# Patient Record
Sex: MALE | Race: BLACK OR AFRICAN AMERICAN | NOT HISPANIC OR LATINO | Employment: FULL TIME | URBAN - METROPOLITAN AREA
[De-identification: names, ages, dates, MRNs, and addresses within clinical notes are randomized per-mention and may not be internally consistent; named-entity substitution may affect disease eponyms.]

---

## 2017-01-26 ENCOUNTER — OFFICE VISIT (OUTPATIENT)
Dept: DERMATOLOGY | Facility: CLINIC | Age: 32
End: 2017-01-26

## 2017-01-26 DIAGNOSIS — B35.1 ONYCHOMYCOSIS: ICD-10-CM

## 2017-01-26 DIAGNOSIS — B35.1 ONYCHOMYCOSIS: Primary | ICD-10-CM

## 2017-01-26 LAB
ALBUMIN SERPL-MCNC: 4 G/DL (ref 3.4–5)
ALP SERPL-CCNC: 69 U/L (ref 40–150)
ALT SERPL W P-5'-P-CCNC: 16 U/L (ref 0–70)
AST SERPL W P-5'-P-CCNC: 8 U/L (ref 0–45)
BASOPHILS # BLD AUTO: 0 10E9/L (ref 0–0.2)
BASOPHILS NFR BLD AUTO: 0.6 %
BILIRUB DIRECT SERPL-MCNC: 0.1 MG/DL (ref 0–0.2)
BILIRUB SERPL-MCNC: 0.5 MG/DL (ref 0.2–1.3)
DIFFERENTIAL METHOD BLD: NORMAL
EOSINOPHIL # BLD AUTO: 0.2 10E9/L (ref 0–0.7)
EOSINOPHIL NFR BLD AUTO: 2.4 %
ERYTHROCYTE [DISTWIDTH] IN BLOOD BY AUTOMATED COUNT: 13.7 % (ref 10–15)
HCT VFR BLD AUTO: 45.1 % (ref 40–53)
HGB BLD-MCNC: 15.6 G/DL (ref 13.3–17.7)
IMM GRANULOCYTES # BLD: 0 10E9/L (ref 0–0.4)
IMM GRANULOCYTES NFR BLD: 0.2 %
LYMPHOCYTES # BLD AUTO: 2.3 10E9/L (ref 0.8–5.3)
LYMPHOCYTES NFR BLD AUTO: 34.9 %
MCH RBC QN AUTO: 31.1 PG (ref 26.5–33)
MCHC RBC AUTO-ENTMCNC: 34.6 G/DL (ref 31.5–36.5)
MCV RBC AUTO: 90 FL (ref 78–100)
MONOCYTES # BLD AUTO: 0.6 10E9/L (ref 0–1.3)
MONOCYTES NFR BLD AUTO: 9.3 %
NEUTROPHILS # BLD AUTO: 3.5 10E9/L (ref 1.6–8.3)
NEUTROPHILS NFR BLD AUTO: 52.6 %
NRBC # BLD AUTO: 0 10*3/UL
NRBC BLD AUTO-RTO: 0 /100
PLATELET # BLD AUTO: 216 10E9/L (ref 150–450)
PROT SERPL-MCNC: 7 G/DL (ref 6.8–8.8)
RBC # BLD AUTO: 5.01 10E12/L (ref 4.4–5.9)
WBC # BLD AUTO: 6.7 10E9/L (ref 4–11)

## 2017-01-26 RX ORDER — TERBINAFINE HYDROCHLORIDE 250 MG/1
250 TABLET ORAL DAILY
Qty: 30 TABLET | Refills: 0 | Status: SHIPPED | OUTPATIENT
Start: 2017-01-26 | End: 2017-08-07

## 2017-01-26 ASSESSMENT — PAIN SCALES - GENERAL: PAINLEVEL: NO PAIN (0)

## 2017-01-26 NOTE — LETTER
1/26/2017       RE: Minh Pereira  [address]    [facility] [address]     Dear Colleague,    Thank you for referring your patient, Minh Pereira, to the [facility] DERMATOLOGY at [facility]. Please see a copy of my visit note below.    [facility] Dermatology Clinic Note     Dermatology Problem List:  1. Plaque psoriasis and psoriatic arthritis: Many years of disease.   -s/p methotrexate   - failed Enbrel/flared on enbrel  - on Humira 40 mg Q 2 weeks   2. Onychomycosis. culture 08/11/2016  Encounter Date: Jan 26, 2017   Chief Complaint   Patient presents with     Derm Problem     Psoriasis - on Humira. Minh states that his skin is doing well.     History of Present Illness:   This is a 31 year old male who presents to dermatology clinic for a follow up for plaque psoriasis and onychomycosis. He was last seen here 12/142016 when Humira injections were continued every other week, triamcinolone ointment was continued to the arms, legs, and trunk, and desonide ointment was continued twice daily to the face as needed.  His psoriasis is well controlled with his current regimen, and he has no adverse events to report.    For his onychomycosis, he was started on terbinafine 250 mg. He believes this nails have started to improve. He denies any nausea, vomiting, diarrhea, rashes, fatigue or new symptoms. No other skin concerns.       Past Medical History:   Allergies as of    Diagnosis     Psoriatic arthropathy (H)     Clavus     Backache     Hyperkeratosis of palms and soles     Psoriasis     Onychodystrophy     Encounter for long-term (current) use of high-risk medication     Social History:   The patient works for [employer] and [employer].    Medications:   Current Outpatient Prescriptions   Medication     adalimumab (HUMIRA PEN) 40 MG/0.8ML pen kit     vitamin D (ERGOCALCIFEROL) 98512 UNIT capsule     naproxen (NAPROSYN) 500 MG  tablet     fluocinonide (LIDEX) 0.05 % ointment     [DISCONTINUED] adalimumab (HUMIRA) 40 MG/0.8ML prefilled syringe kit     [DISCONTINUED] adalimumab (HUMIRA) 40 MG/0.8ML prefilled syringe kit     No current facility-administered medications for this visit.     Allergies as of 01/26/2017 - Review Complete 01/26/2017   Allergen Reaction Noted     Bees Swelling 05/05/2014     Nkda [no known drug allergies]  01/31/2013     Review of Systems:   Patient reports otherwise feeling well and denies any other skin concerns.  -MS: Arthralgias with season change  GI: No abdominal discomfort. No diarrhea or hematochezia.  Physical exam:   Vital signs: B/P: Data Unavailable, T: Data Unavailable, P: Data Unavailable, R: Data Unavailable  General: Well appearing male in no acute distress alert and oriented to time, person, place, and situation.  Examination: Examination of the face, head, neck, chest, abdomen, back, both arms, both hands, both legs, and both feet is performed.   -Postinflammatory hyperpigmentation of prior guttate papules noted throughout the trunk and lower extremities.  -Pitting noted of the fingernails.  -The toenail plates are hypertrophic and discolored with subungual debris, evidence of proximal clearing.     Assessment and Plan:   1. Plaque psoriasis and psoriatic arthritis, well controlled:     Humira injections every other week as directed.    Please continue to apply triamcinolone ointment twice daily to affected areas on the arms, legs and trunk as needed until improved.    Please continue desonide ointment twice daily to affected areas on the face as needed until improved.      2. Onychomycosis. Confirmed by fungus culture 08/11/2016:    Continue terbinafine 250 mg by mouth once daily.    Draw for CBC and hepatic panel, these are within normal limits.     Staff:      All risk, benefits and alternatives were discussed with patient.  Patient is in agreement and understands the assessment and plan.  All  questions were answered.  Sun Screen Education was given.   Return to Clinic in 6 weeks or sooner as needed.   Zakiya Caal PA-C

## 2017-01-26 NOTE — NURSING NOTE
Dermatology Rooming Note    Minh Pereira's goals for this visit include:   Chief Complaint   Patient presents with     Derm Problem     Psoriasis - on Humira. Minh states that his skin is doing well.     Irina Park, CMA

## 2017-01-26 NOTE — PROGRESS NOTES
[facility] Dermatology Clinic Note     Dermatology Problem List:  1. Plaque psoriasis and psoriatic arthritis: Many years of disease.   -s/p methotrexate   - failed Enbrel/flared on enbrel  - on Humira 40 mg Q 2 weeks   2. Onychomycosis. culture 08/11/2016  Encounter Date: Jan 26, 2017   Chief Complaint   Patient presents with     Derm Problem     Psoriasis - on Humira. Minh states that his skin is doing well.     History of Present Illness:   This is a 31 year old male who presents to dermatology clinic for a follow up for plaque psoriasis and onychomycosis. He was last seen here 12/142016 when Humira injections were continued every other week, triamcinolone ointment was continued to the arms, legs, and trunk, and desonide ointment was continued twice daily to the face as needed.  His psoriasis is well controlled with his current regimen, and he has no adverse events to report.    For his onychomycosis, he was started on terbinafine 250 mg. He believes this nails have started to improve. He denies any nausea, vomiting, diarrhea, rashes, fatigue or new symptoms. No other skin concerns.       Past Medical History:   Allergies as of    Diagnosis     Psoriatic arthropathy (H)     Clavus     Backache     Hyperkeratosis of palms and soles     Psoriasis     Onychodystrophy     Encounter for long-term (current) use of high-risk medication     Social History:   The patient works for [employer] and [employer].    Medications:   Current Outpatient Prescriptions   Medication     adalimumab (HUMIRA PEN) 40 MG/0.8ML pen kit     vitamin D (ERGOCALCIFEROL) 32345 UNIT capsule     naproxen (NAPROSYN) 500 MG tablet     fluocinonide (LIDEX) 0.05 % ointment     [DISCONTINUED] adalimumab (HUMIRA) 40 MG/0.8ML prefilled syringe kit     [DISCONTINUED] adalimumab (HUMIRA) 40 MG/0.8ML prefilled syringe kit     No current facility-administered medications for this visit.     Allergies as of 01/26/2017 - Review  Complete 01/26/2017   Allergen Reaction Noted     Bees Swelling 05/05/2014     Nkda [no known drug allergies]  01/31/2013     Review of Systems:   Patient reports otherwise feeling well and denies any other skin concerns.  -MS: Arthralgias with season change  GI: No abdominal discomfort. No diarrhea or hematochezia.  Physical exam:   Vital signs: B/P: Data Unavailable, T: Data Unavailable, P: Data Unavailable, R: Data Unavailable  General: Well appearing male in no acute distress alert and oriented to time, person, place, and situation.  Examination: Examination of the face, head, neck, chest, abdomen, back, both arms, both hands, both legs, and both feet is performed.   -Postinflammatory hyperpigmentation of prior guttate papules noted throughout the trunk and lower extremities.  -Pitting noted of the fingernails.  -The toenail plates are hypertrophic and discolored with subungual debris, evidence of proximal clearing.     Assessment and Plan:   1. Plaque psoriasis and psoriatic arthritis, well controlled:     Humira injections every other week as directed.    Please continue to apply triamcinolone ointment twice daily to affected areas on the arms, legs and trunk as needed until improved.    Please continue desonide ointment twice daily to affected areas on the face as needed until improved.      2. Onychomycosis. Confirmed by fungus culture 08/11/2016:    Continue terbinafine 250 mg by mouth once daily.    Draw for CBC and hepatic panel, these are within normal limits.     Staff:      All risk, benefits and alternatives were discussed with patient.  Patient is in agreement and understands the assessment and plan.  All questions were answered.  Sun Screen Education was given.   Return to Clinic in 6 weeks or sooner as needed.   Zakiya Caal PA-C

## 2017-01-26 NOTE — MR AVS SNAPSHOT
After Visit Summary   2017    Minh Pereira    MRN: [id number]           Patient Information     Date Of Birth          1985        Visit Information        Provider Department      2017 1:00 PM Zakiya Caal PA-C [facility] Dermatology        Today's Diagnoses     Onychomycosis    -  1       Follow-ups after your visit        Follow-up notes from your care team     Return in about 6 weeks (around 3/9/2017).      Your next 10 appointments already scheduled     Mar 09, 2017 12:00 PM CST   (Arrive by 11:45 AM)   Return Visit with RADHA Chairez [facility] Dermatology ([facility])    [address] [place] [id number] [phone]              Who to contact     Please call your clinic at [phone] to:    Ask questions about your health    Make or cancel appointments    Discuss your medicines    Learn about your test results    Speak to your doctor   If you have compliments or concerns about an experience at your clinic, or if you wish to file a complaint, please contact [facility] Patient Relations at [phone] or email us at [email or web address]         Additional Information About Your Visit        MyChart Information     [employer] is an electronic gateway that provides easy, online access to your medical records. With [employer], you can request a clinic appointment, read your test results, renew a prescription or communicate with your care team.     To sign up for [employer] visit the website at [email or web address]   You will be asked to enter the access code listed below, as well as some personal information. Please follow the directions to create your username and password.     Your access code is: [email or web address]  Expires: 2017  6:30 AM     Your access code will  in 90 days. If you need help or a new code, please contact your [facility]  Physicians Clinic or call [phone] for assistance.        Care EveryWhere ID     This is your Care EveryWhere ID. This could be used by other organizations to access your [facility] medical records  [id number]         Blood Pressure from Last 3 Encounters:   08/05/16 117/73   04/06/16 118/72   02/05/16 139/62    Weight from Last 3 Encounters:   08/05/16 74.6 kg (164 lb 6.4 oz)   04/06/16 78.4 kg (172 lb 12.8 oz)   02/05/16 78.5 kg (173 lb)                 Today's Medication Changes          These changes are accurate as of: 1/26/17 11:59 PM.  If you have any questions, ask your nurse or doctor.               These medicines have changed or have updated prescriptions.        Dose/Directions    adalimumab 40 MG/0.8ML pen kit   Commonly known as:  HUMIRA PEN   This may have changed:  Another medication with the same name was removed. Continue taking this medication, and follow the directions you see here.   Used for:  Psoriasis   Changed by:  Zakiya Caal PA-C        Dose:  40 mg   Inject 0.8 mLs (40 mg) Subcutaneous every 14 days   Quantity:  2 each   Refills:  4            Where to get your medicines      These medications were sent to [facility] - [place], [place] - [address] [place] [address]  [address] [place] [id number]    Hours:  Mon-Fri 8:30am-5:00pm Toll Free [phone] Phone:  [phone]     terbinafine 250 MG tablet                Primary Care Provider    [facility]       No address on file        Thank you!     Thank you for choosing [facility] DERMATOLOGY  for your care. Our goal is always to provide you with excellent care. Hearing back from our patients is one way we can continue to improve our services. Please take a few minutes to complete the written survey that you may receive in the mail after your visit with us. Thank you!             Your Updated Medication List - Protect others around you: Learn how to safely use, store and  throw away your medicines at www.disposemymeds.org.          This list is accurate as of: 1/26/17 11:59 PM.  Always use your most recent med list.                   Brand Name Dispense Instructions for use    adalimumab 40 MG/0.8ML pen kit    HUMIRA PEN    2 each    Inject 0.8 mLs (40 mg) Subcutaneous every 14 days       fluocinonide 0.05 % ointment    LIDEX    60 g    Apply topically 2 times daily       naproxen 500 MG tablet    NAPROSYN    60 tablet    Take 1 tablet (500 mg) by mouth 2 times daily (with meals)       terbinafine 250 MG tablet    lamISIL    30 tablet    Take 1 tablet (250 mg) by mouth daily       vitamin D 60500 UNIT capsule    ERGOCALCIFEROL    12 capsule    Take 1 capsule (50,000 Units) by mouth every 7 days

## 2017-03-09 ENCOUNTER — OFFICE VISIT (OUTPATIENT)
Dept: DERMATOLOGY | Facility: CLINIC | Age: 32
End: 2017-03-09

## 2017-03-09 DIAGNOSIS — L40.9 PSORIASIS: ICD-10-CM

## 2017-03-09 DIAGNOSIS — B35.1 ONYCHOMYCOSIS: Primary | ICD-10-CM

## 2017-03-09 RX ORDER — KETOCONAZOLE 20 MG/G
CREAM TOPICAL DAILY
Qty: 60 G | Refills: 11 | Status: SHIPPED | OUTPATIENT
Start: 2017-03-09 | End: 2018-10-10

## 2017-03-09 ASSESSMENT — PAIN SCALES - GENERAL: PAINLEVEL: NO PAIN (0)

## 2017-03-09 NOTE — NURSING NOTE
Dermatology Rooming Note    Minh Pereira's goals for this visit include:   Chief Complaint   Patient presents with     Derm Problem     Minh is here today for psoriasis follow up.      Lynn Summers MA

## 2017-03-09 NOTE — LETTER
3/9/2017       RE: Minh Pereira  [address]    [facility] [address]     Dear Colleague,    Thank you for referring your patient, Minh Pereira, to the [facility] DERMATOLOGY at [facility]. Please see a copy of my visit note below.    [facility] Dermatology Clinic Note     Dermatology Problem List:  1. Plaque psoriasis and psoriatic arthritis: Many years of disease.   -s/p methotrexate   - failed Enbrel/flared on enbrel  - on Humira 40 mg Q 2 weeks   2. Onychomycosis. culture 08/11/2016, on terbinafine 250 mg daily. Completed treatment 3/2017  Ketoconazole 2% cream daily  Encounter Date: Mar 9, 2017   Chief Complaint   Patient presents with     Derm Problem     Minh is here today for psoriasis follow up.      History of Present Illness:   This is a 31 year old male who presents to dermatology clinic for a follow up for onychomycosis. He was last seen here 1/26/2017 when he was continued on terbinafine 250 mg daily. His CBC and hepatic panel were within normal limits. He completed 3 months. He believes this nails have improved significantly. He denies any nausea, vomiting, diarrhea, rashes, fatigue or new symptoms.    He continues Humira injections every other week, triamcinolone ointment to the arms, legs, and trunk, and desonide ointment was continued twice daily to the face as needed.  His psoriasis is well controlled with his current regimen, and he has no adverse events to report.     No other skin concerns.       Past Medical History:   Allergies as of    Diagnosis     Psoriatic arthropathy (H)     Clavus     Backache     Hyperkeratosis of palms and soles     Psoriasis     Onychodystrophy     Encounter for long-term (current) use of high-risk medication     Social History:   The patient works for [employer] and [employer]    Medications:   Current Outpatient Prescriptions   Medication     terbinafine (LAMISIL) 250 MG  tablet     adalimumab (HUMIRA PEN) 40 MG/0.8ML pen kit     vitamin D (ERGOCALCIFEROL) 32158 UNIT capsule     naproxen (NAPROSYN) 500 MG tablet     fluocinonide (LIDEX) 0.05 % ointment     No current facility-administered medications for this visit.      Allergies as of 03/09/2017 - Review Complete 03/09/2017   Allergen Reaction Noted     Bees Swelling 05/05/2014     Nkda [no known drug allergies]  01/31/2013     Review of Systems:   Patient reports otherwise feeling well and denies any other skin concerns.  -MS: Arthralgias with season change  GI: No abdominal discomfort. No diarrhea or hematochezia.  Physical exam:   Vital signs: B/P: Data Unavailable, T: Data Unavailable, P: Data Unavailable, R: Data Unavailable  General: Well appearing male in no acute distress alert and oriented to time, person, place, and situation.  Examination: Examination of the face, head, neck, chest, abdomen, back, both arms, both hands, both legs, and both feet is performed.   -Postinflammatory hyperpigmentation of prior guttate papules noted throughout the trunk and lower extremities.  -The toenail plates are thinner and lighter in color. There is clearance to all nails proximally.   Assessment and Plan:   1. Plaque psoriasis and psoriatic arthritis, well controlled:     Humira injections every other week as directed.    Please continue to apply triamcinolone ointment twice daily to affected areas on the arms, legs and trunk as needed until improved.    Please continue desonide ointment twice daily to affected areas on the face as needed until improved.    Will check labs at f/u.       2. HX of Onychomycosis and 3 months of terbinafine treatment.    Start ketoconazole 2% cream daily in between toes and bottoms of feet.       Staff:      All risks, benefits and alternatives were discussed with patient.  Patient is in agreement and understands the assessment and plan.  All questions were answered.  Sun Screen Education was given.   Return  to Clinic in 3 months or sooner as needed.   Zakiya Caal PA-C

## 2017-03-09 NOTE — PROGRESS NOTES
[facility] Dermatology Clinic Note     Dermatology Problem List:  1. Plaque psoriasis and psoriatic arthritis: Many years of disease.   -s/p methotrexate   - failed Enbrel/flared on enbrel  - on Humira 40 mg Q 2 weeks   2. Onychomycosis. culture 08/11/2016, on terbinafine 250 mg daily. Completed treatment 3/2017  Ketoconazole 2% cream daily  Encounter Date: Mar 9, 2017   Chief Complaint   Patient presents with     Derm Problem     Minh is here today for psoriasis follow up.      History of Present Illness:   This is a 31 year old male who presents to dermatology clinic for a follow up for onychomycosis. He was last seen here 1/26/2017 when he was continued on terbinafine 250 mg daily. His CBC and hepatic panel were within normal limits. He completed 3 months. He believes this nails have improved significantly. He denies any nausea, vomiting, diarrhea, rashes, fatigue or new symptoms.    He continues Humira injections every other week, triamcinolone ointment to the arms, legs, and trunk, and desonide ointment was continued twice daily to the face as needed.  His psoriasis is well controlled with his current regimen, and he has no adverse events to report.     No other skin concerns.       Past Medical History:   Allergies as of    Diagnosis     Psoriatic arthropathy (H)     Clavus     Backache     Hyperkeratosis of palms and soles     Psoriasis     Onychodystrophy     Encounter for long-term (current) use of high-risk medication     Social History:   The patient works for [employer] and [facility].    Medications:   Current Outpatient Prescriptions   Medication     terbinafine (LAMISIL) 250 MG tablet     adalimumab (HUMIRA PEN) 40 MG/0.8ML pen kit     vitamin D (ERGOCALCIFEROL) 28973 UNIT capsule     naproxen (NAPROSYN) 500 MG tablet     fluocinonide (LIDEX) 0.05 % ointment     No current facility-administered medications for this visit.      Allergies as of 03/09/2017 - Review  Complete 03/09/2017   Allergen Reaction Noted     Bees Swelling 05/05/2014     Nkda [no known drug allergies]  01/31/2013     Review of Systems:   Patient reports otherwise feeling well and denies any other skin concerns.  -MS: Arthralgias with season change  GI: No abdominal discomfort. No diarrhea or hematochezia.  Physical exam:   Vital signs: B/P: Data Unavailable, T: Data Unavailable, P: Data Unavailable, R: Data Unavailable  General: Well appearing male in no acute distress alert and oriented to time, person, place, and situation.  Examination: Examination of the face, head, neck, chest, abdomen, back, both arms, both hands, both legs, and both feet is performed.   -Postinflammatory hyperpigmentation of prior guttate papules noted throughout the trunk and lower extremities.  -The toenail plates are thinner and lighter in color. There is clearance to all nails proximally.   Assessment and Plan:   1. Plaque psoriasis and psoriatic arthritis, well controlled:     Humira injections every other week as directed.    Please continue to apply triamcinolone ointment twice daily to affected areas on the arms, legs and trunk as needed until improved.    Please continue desonide ointment twice daily to affected areas on the face as needed until improved.    Will check labs at f/u.       2. HX of Onychomycosis and 3 months of terbinafine treatment.    Start ketoconazole 2% cream daily in between toes and bottoms of feet.       Staff:      All risks, benefits and alternatives were discussed with patient.  Patient is in agreement and understands the assessment and plan.  All questions were answered.  Sun Screen Education was given.   Return to Clinic in 3 months or sooner as needed.   Zakiya Caal PA-C

## 2017-03-09 NOTE — MR AVS SNAPSHOT
After Visit Summary   3/9/2017    Minh Pereira    MRN: [id number]           Patient Information     Date Of Birth          1985        Visit Information        Provider Department      3/9/2017 12:00 PM Zakiya Caal PA-C M [facility] D[facility]atology        Today's Diagnoses     Onychomycosis    -  1    Psoriasis           Follow-ups after your visit        Follow-up notes from your care team     Return in about 3 months (around 6/9/2017).      Your next 10 appointments already scheduled     Serafin 15, 2017 11:45 AM CDT   LAB with  LAB   [facility] ([facility])    [address] [place] [id number] [phone]           Patient must bring picture ID.  Patient should be prepared to give a urine specimen  Please do not eat 10-12 hours before your appointment if you are coming in fasting for labs on lipids, cholesterol, or glucose (sugar).  Pregnant women should follow their Care Team instructions. Water with medications is okay. Do not drink coffee or other fluids.   If you have concerns about taking  your medications, please ask at office or if scheduling via [place], send a message by clicking on Secure Messaging, Message Your Care Team.            Serafin 15, 2017 12:00 PM CDT   (Arrive by 11:45 AM)   Return Visit with RADHA Chairez [facility]at[facility] ([facility])    [address] [place] [id number] [phone]              Future tests that were ordered for you today     Open Future Orders        Priority Expected Expires Ordered    CBC with platelets differential Routine 6/9/2017 3/9/2018 3/9/2017    Comprehensive metabolic panel Routine 6/9/2017 3/9/2018 3/9/2017    M Tuberculosis by Quantiferon Routine 6/9/2017 3/9/2018 3/9/2017            Who to contact     Please call your clinic at [phone] to:    Ask questions about your health    Make or cancel  appointments    Discuss your medicines    Learn about your test results    Speak to your doctor   If you have compliments or concerns about an experience at your clinic, or if you wish to file a complaint, please contact [facility] Patient Relations at [phone] or email us at [email or web address]         Additional Information About Your Visit        Satietyhart Information     IntelliWare Systemst is an electronic gateway that provides easy, online access to your medical records. With ASSURED INFORMATION SECURITY, you can request a clinic appointment, read your test results, renew a prescription or communicate with your care team.     To sign up for ASSURED INFORMATION SECURITY visit the website at [email or web address]   You will be asked to enter the access code listed below, as well as some personal information. Please follow the directions to create your username and password.     Your access code is: [email or web address]  Expires: 2017  6:30 AM     Your access code will  in 90 days. If you need help or a new code, please contact your [facility] or call [phone] for assistance.        Care EveryWhere ID     This is your Care EveryWhere ID. This could be used by other organizations to access your [facility] medical records  [id number]         Blood Pressure from Last 3 Encounters:   16 117/73   16 118/72   16 139/62    Weight from Last 3 Encounters:   16 74.6 kg (164 lb 6.4 oz)   16 78.4 kg (172 lb 12.8 oz)   16 78.5 kg (173 lb)                 Today's Medication Changes          These changes are accurate as of: 3/9/17  8:20 PM.  If you have any questions, ask your nurse or doctor.               Start taking these medicines.        Dose/Directions    ketoconazole 2 % cream   Commonly known as:  NIZORAL   Used for:  Onychomycosis   Started by:  Zakiya Caal PA-C        Apply topically daily In between toes and bottoms of feet.   Quantity:   60 g   Refills:  11            Where to get your medicines      These medications were sent to [facility] - [place], [place] - [address]   [address] , [facility] [address]    Hours:  Mon-Fri 8:30am-5:00pm Toll Free [phone] Phone:  [phone]     ketoconazole 2 % cream                Primary Care Provider    [facility]       No address on file        Thank you!     Thank you for choosing [facility] DERMATOLOGY  for your care. Our goal is always to provide you with excellent care. Hearing back from our patients is one way we can continue to improve our services. Please take a few minutes to complete the written survey that you may receive in the mail after your visit with us. Thank you!             Your Updated Medication List - Protect others around you: Learn how to safely use, store and throw away your medicines at www.disposemymeds.org.          This list is accurate as of: 3/9/17  8:20 PM.  Always use your most recent med list.                   Brand Name Dispense Instructions for use    adalimumab 40 MG/0.8ML pen kit    HUMIRA PEN    2 each    Inject 0.8 mLs (40 mg) Subcutaneous every 14 days       fluocinonide 0.05 % ointment    LIDEX    60 g    Apply topically 2 times daily       ketoconazole 2 % cream    NIZORAL    60 g    Apply topically daily In between toes and bottoms of feet.       naproxen 500 MG tablet    NAPROSYN    60 tablet    Take 1 tablet (500 mg) by mouth 2 times daily (with meals)       terbinafine 250 MG tablet    lamISIL    30 tablet    Take 1 tablet (250 mg) by mouth daily       vitamin D 43330 UNIT capsule    ERGOCALCIFEROL    12 capsule    Take 1 capsule (50,000 Units) by mouth every 7 days

## 2017-04-19 DIAGNOSIS — L40.9 PSORIASIS: ICD-10-CM

## 2017-04-19 NOTE — TELEPHONE ENCOUNTER
Received refill request for humira as CRISTHIAN. NP Afua notes reviewed. Last quant gold 6/16 negative. Refill appropriate, and accepted.    Monserrat Matos MD  Medicine/Dermatology, PGY-4  [facility]

## 2017-04-25 ENCOUNTER — TELEPHONE (OUTPATIENT)
Dept: DERMATOLOGY | Facility: CLINIC | Age: 32
End: 2017-04-25

## 2017-04-25 NOTE — TELEPHONE ENCOUNTER
Pharmacy called and stated they didn't not receive a refill authorization for the pts humira pen as requested.  Records show this was ordered by the CRISTHIAN on 4/19/17.  Writer read directions per CRISTHIAN to pharmacist.  No further questions at this time. It was reported this medication will  Be delievered to pt toady or tomorrow

## 2017-07-04 ENCOUNTER — TELEPHONE (OUTPATIENT)
Dept: DERMATOLOGY | Facility: CLINIC | Age: 32
End: 2017-07-04

## 2017-07-04 NOTE — TELEPHONE ENCOUNTER
PA Initiation    Medication: HUMIRA  Insurance Company: [employer] - Phone [phone] Fax [phone]  Pharmacy Filling the Rx: [facility] - [place], [place] - [facility] [address]  Filling Pharmacy Phone: [phone]  Filling Pharmacy Fax: [phone]  Start Date: 7/4/2017      [facility] Authorization Team   Phone: [phone]  Fax: [phone]

## 2017-07-07 NOTE — TELEPHONE ENCOUNTER
Additional Information Needed    Medication Humira  Insurance HTP  Date Of Request 7/5/17  Comments Form filled out and faxed back to [facility] with chart notes via fax# [phone]

## 2017-07-10 NOTE — TELEPHONE ENCOUNTER
Prior Authorization Approval    Authorization Effective Date: 6/7/2017  Authorization Expiration Date: 7/7/2018  Medication: adalimumab (Humira) (Approved)  Approved Dose/Quantity: qs  Reference #: [id number]   Insurance Company: [employer] - Phone [phone] Fax [phone]  Expected CoPay:       CoPay Card Available:      Foundation Assistance Needed:    Which Pharmacy is filling the prescription (Not needed for infusion/clinic administered): [facility] - [place] [address]  Pharmacy Notified:  yes  Patient Notified:  Yes

## 2017-08-07 ENCOUNTER — OFFICE VISIT (OUTPATIENT)
Dept: DERMATOLOGY | Facility: CLINIC | Age: 32
End: 2017-08-07

## 2017-08-07 DIAGNOSIS — L40.9 PSORIASIS: ICD-10-CM

## 2017-08-07 DIAGNOSIS — Z79.899 MEDICATION MANAGEMENT: ICD-10-CM

## 2017-08-07 DIAGNOSIS — Z86.19 HISTORY OF ONYCHOMYCOSIS: Primary | ICD-10-CM

## 2017-08-07 LAB
ALBUMIN SERPL-MCNC: 4 G/DL (ref 3.4–5)
ALP SERPL-CCNC: 81 U/L (ref 40–150)
ALT SERPL W P-5'-P-CCNC: 20 U/L (ref 0–70)
ANION GAP SERPL CALCULATED.3IONS-SCNC: 8 MMOL/L (ref 3–14)
AST SERPL W P-5'-P-CCNC: 14 U/L (ref 0–45)
BASOPHILS # BLD AUTO: 0 10E9/L (ref 0–0.2)
BASOPHILS NFR BLD AUTO: 0.6 %
BILIRUB SERPL-MCNC: 0.5 MG/DL (ref 0.2–1.3)
BUN SERPL-MCNC: 15 MG/DL (ref 7–30)
CALCIUM SERPL-MCNC: 8.9 MG/DL (ref 8.5–10.1)
CHLORIDE SERPL-SCNC: 104 MMOL/L (ref 94–109)
CO2 SERPL-SCNC: 24 MMOL/L (ref 20–32)
CREAT SERPL-MCNC: 0.78 MG/DL (ref 0.66–1.25)
DIFFERENTIAL METHOD BLD: NORMAL
EOSINOPHIL # BLD AUTO: 0.2 10E9/L (ref 0–0.7)
EOSINOPHIL NFR BLD AUTO: 3.1 %
ERYTHROCYTE [DISTWIDTH] IN BLOOD BY AUTOMATED COUNT: 12.8 % (ref 10–15)
GFR SERPL CREATININE-BSD FRML MDRD: NORMAL ML/MIN/1.7M2
GLUCOSE SERPL-MCNC: 91 MG/DL (ref 70–99)
HCT VFR BLD AUTO: 47.2 % (ref 40–53)
HGB BLD-MCNC: 16.3 G/DL (ref 13.3–17.7)
IMM GRANULOCYTES # BLD: 0 10E9/L (ref 0–0.4)
IMM GRANULOCYTES NFR BLD: 0.1 %
LYMPHOCYTES # BLD AUTO: 2.5 10E9/L (ref 0.8–5.3)
LYMPHOCYTES NFR BLD AUTO: 36.8 %
MCH RBC QN AUTO: 30.5 PG (ref 26.5–33)
MCHC RBC AUTO-ENTMCNC: 34.5 G/DL (ref 31.5–36.5)
MCV RBC AUTO: 88 FL (ref 78–100)
MONOCYTES # BLD AUTO: 0.5 10E9/L (ref 0–1.3)
MONOCYTES NFR BLD AUTO: 7.9 %
NEUTROPHILS # BLD AUTO: 3.5 10E9/L (ref 1.6–8.3)
NEUTROPHILS NFR BLD AUTO: 51.5 %
NRBC # BLD AUTO: 0 10*3/UL
NRBC BLD AUTO-RTO: 0 /100
PLATELET # BLD AUTO: 235 10E9/L (ref 150–450)
POTASSIUM SERPL-SCNC: 4.3 MMOL/L (ref 3.4–5.3)
PROT SERPL-MCNC: 7.5 G/DL (ref 6.8–8.8)
RBC # BLD AUTO: 5.34 10E12/L (ref 4.4–5.9)
SODIUM SERPL-SCNC: 136 MMOL/L (ref 133–144)
WBC # BLD AUTO: 6.7 10E9/L (ref 4–11)

## 2017-08-07 RX ORDER — TRIAMCINOLONE ACETONIDE 1 MG/G
OINTMENT TOPICAL 2 TIMES DAILY PRN
Qty: 453 G | Refills: 2 | Status: SHIPPED | OUTPATIENT
Start: 2017-08-07 | End: 2023-09-21

## 2017-08-07 ASSESSMENT — PAIN SCALES - GENERAL: PAINLEVEL: NO PAIN (0)

## 2017-08-07 NOTE — LETTER
"8/7/2017       RE: Minh Pereira  [address]    [facility] [address]     Dear Colleague,    Thank you for referring your patient, Minh Pereira, to the [facility] DERMATOLOG[facility] at [facility]. Please see a copy of my visit note below.    [facility] Dermatology Clinic Note     Dermatology Problem List:  1. Plaque psoriasis and psoriatic arthritis: Many years of disease.   - s/p methotrexate, failed Enbrel/flared on enbrel  - on Humira 40 mg Q 2 weeks, triamcinolone ointment, desonide ointment  2. Onychomycosis. culture 08/11/2016, on terbinafine 250 mg daily. Completed treatment 3/2017  - s/p ketoconazole 2% cream daily    Encounter Date: Aug 7, 2017   Chief Complaint   Patient presents with     Derm Problem     Psoriasis follow up, Minh states \" It has its ups and downs.\"     Psoriasis     History of Present Illness:   This is a 31 year old male who presents to dermatology clinic for a follow up for onychomycosis and psoriasis. Today the patient reports that he had insurance issues so he was off of the Humira for 2 months starting in May and had a few small flares, but overall his skin was okay. He started again in the beginning of July. He always uses the triamcinolone to keep a base on his skin. He can  well and has no problems with joints. He also reports that he nails are doing well and they are growing relatively normal again. He is happy about this. The patient reports no other lesions of concern at this time.     Otherwise, the patient reports no painful, bleeding, nonhealing, or pruritic lesions, and denies new or changing moles.    Past Medical History:   Allergies as of    Diagnosis     Psoriatic arthropathy (H)     Clavus     Backache     Hyperkeratosis of palms and soles     Psoriasis     Encounter for long-term (current) use of high-risk medication     Social History:   The patient use to work for [employer] and [employer]. He now works as a supervisor position, managing a team.    Medications:   Current Outpatient Prescriptions   Medication     adalimumab (HUMIRA PEN) 40 MG/0.8ML pen kit     ketoconazole (NIZORAL) 2 % cream     terbinafine (LAMISIL) 250 MG tablet     vitamin D (ERGOCALCIFEROL) 52625 UNIT capsule     naproxen (NAPROSYN) 500 MG tablet     fluocinonide (LIDEX) 0.05 % ointment     No current facility-administered medications for this visit.      Allergies as of 08/07/2017 - Gonzalez as Reviewed 08/07/2017   Allergen Reaction Noted     Bees Swelling 05/05/2014     Nkda [no known drug allergies]  01/31/2013     Review of Systems:   Patient reports otherwise feeling well and denies any other skin concerns.  - He denies fever, chronic cough, rashes, swollen lymph nodes, headaches, or numbness    Physical exam:   GEN: Well appearing male in no acute distress alert and oriented to time, person, place, and situation.  SKIN: Skin examination of the face, scalp, chest,abdomen, back, arms including hands as well as feet  was performed. Significant for:   - Rare scaly papule to trunk and extremities  - Scattered hyperemic macules to trunk and extremities from previous eruptions   - Minimal pitting to fingernails  - Toenails are thickened but no longer yellow  - No other lesions of concern on areas examined    Assessment and Plan:   1. Plaque psoriasis and psoriatic arthritis, well controlled:   - Continue Humira injections every other week as directed. Discussed calling the office if any concerning side effects or fevers etc.   -CBC, CMP were checked today, wnl, M Tuberculosis Quant was checked as well, negative. Will recheck these annually.     - Continue triamcinolone ointment - apply twice daily to affected areas on the arms, legs and trunk as needed until improved.  - Continue desonide ointment - apply twice daily to affected areas on the face as needed until improved.    2. HX of Onychomycosis and 3 months of  terbinafine treatment.  - Stop terbinafine.   -Continue ketoconazole 2% cream - appydaily in between toes and bottoms of feet, to prevent recurrence of t pedis or onychomycosis.     Follow up in 6 months, earlier for new or changing lesions.    Staff Involved:  Scribe Disclosure:   I, Elisa Thomas, am serving as a scribe to document services personally performed by Zakiya Caal PA-C, based on data collection and the provider's statements to me.  Provider Disclosure:   The documentation recorded by the scribe accurately reflects the services I personally performed and the decisions made by me.    All risks, benefits and alternatives were discussed with patient.  Patient is in agreement and understands the assessment and plan.  All questions were answered.  Sun Screen Education was given.   Return to Clinic in 6 months or sooner as needed.   Zakiya Caal PA-C   [facility]ermat[facility]

## 2017-08-07 NOTE — NURSING NOTE
"Dermatology Rooming Note    Minh Pereira's goals for this visit include:   Chief Complaint   Patient presents with     Derm Problem     Psoriasis follow up, Minh states \" It has its ups and downs.\"     Psoriasis     Shefali Figueroa LPN  "

## 2017-08-07 NOTE — MR AVS SNAPSHOT
After Visit Summary   2017    Minh Pereira    MRN: [id number]           Patient Information     Date Of Birth          1985        Visit Information        Provider Department      2017 8:45 AM Zakiya Caal PA-C M [facility] Dermatology        Today's Diagnoses     History of onychomycosis    -  1    Psoriasis        Medication management           Follow-ups after your visit        Follow-up notes from your care team     Return in about 6 months (around 2018).      Who to contact     Please call your clinic at [phone] to:    Ask questions about your health    Make or cancel appointments    Discuss your medicines    Learn about your test results    Speak to your doctor   If you have compliments or concerns about an experience at your clinic, or if you wish to file a complaint, please contact [facility] Patient Relations at [phone] or email us at [email or web address]         Additional Information About Your Visit        MyChart Information     Hulafrogt is an electronic gateway that provides easy, online access to your medical records. With iPrism Global, you can request a clinic appointment, read your test results, renew a prescription or communicate with your care team.     To sign up for Hulafrogt visit the website at [email or web address]   You will be asked to enter the access code listed below, as well as some personal information. Please follow the directions to create your username and password.     Your access code is: [email or web address]  Expires: 2017  6:30 AM     Your access code will  in 90 days. If you need help or a new code, please contact your [facility] or call [phone] for assistance.        Care EveryWhere ID     This is your Care EveryWhere ID. This could be used by other organizations to access your [facility] medical records  [id number]         Blood Pressure from Last  3 Encounters:   08/05/16 117/73   04/06/16 118/72   02/05/16 139/62    Weight from Last 3 Encounters:   08/05/16 74.6 kg (164 lb 6.4 oz)   04/06/16 78.4 kg (172 lb 12.8 oz)   02/05/16 78.5 kg (173 lb)              Today, you had the following     No orders found for display         Today's Medication Changes          These changes are accurate as of: 8/7/17 11:59 PM.  If you have any questions, ask your nurse or doctor.               Start taking these medicines.        Dose/Directions    triamcinolone 0.1 % ointment   Commonly known as:  KENALOG   Used for:  Psoriasis   Started by:  Zakiya Caal PA-C        Apply topically 2 times daily as needed for irritation To affected areas, avoiding groin and axilla.   Quantity:  453 g   Refills:  2            Where to get your medicines      These medications were sent to [facility] - [address], [facility] [address]    Hours:  Mon-Fri 8:30am-5:00pm Toll Free [phone] Phone:  [phone]     triamcinolone 0.1 % ointment                Primary Care Provider    [facility]       No address on file        Equal Access to Services     CHRIS ROBERTO AH: Hadii darren lunao Soomaali, waaxda luqadaha, qaybta kaalmada adeegyada, rehana hoffmann. So [facility] [phone].    ATENCIÓN: Si habla español, tiene a grigsby disposición servicios gratuitos de asistencia lingüística. Ll[facility] [phone].    We comply with applicable federal civil rights laws and [place] laws. We do not discriminate on the basis of race, color, national origin, age, disability sex, sexual orientation or gender identity.            Thank you!     Thank you for choosing [facility] DERMATOLOGY  for your care. Our goal is always to provide you with excellent care. Hearing back from our patients is one way we can continue to improve our services. Please take a few minutes to complete the  written survey that you may receive in the mail after your visit with us. Thank you!             Your Updated Medication List - Protect others around you: Learn how to safely use, store and throw away your medicines at www.disposemymeds.org.          This list is accurate as of: 8/7/17 11:59 PM.  Always use your most recent med list.                   Brand Name Dispense Instructions for use Diagnosis    fluocinonide 0.05 % ointment    LIDEX    60 g    Apply topically 2 times daily    Psoriasis       ketoconazole 2 % cream    NIZORAL    60 g    Apply topically daily In between toes and bottoms of feet.    Onychomycosis       naproxen 500 MG tablet    NAPROSYN    60 tablet    Take 1 tablet (500 mg) by mouth 2 times daily (with meals)    Psoriatic arthritis (H)       triamcinolone 0.1 % ointment    KENALOG    453 g    Apply topically 2 times daily as needed for irritation To affected areas, avoiding groin and axilla.    Psoriasis       vitamin D 45900 UNIT capsule    ERGOCALCIFEROL    12 capsule    Take 1 capsule (50,000 Units) by mouth every 7 days    Psoriatic arthritis (H)

## 2017-08-07 NOTE — PROGRESS NOTES
"[facility] Dermatology Clinic Note     Dermatology Problem List:  1. Plaque psoriasis and psoriatic arthritis: Many years of disease.   - s/p methotrexate, failed Enbrel/flared on enbrel  - on Humira 40 mg Q 2 weeks, triamcinolone ointment, desonide ointment  2. Onychomycosis. culture 08/11/2016, on terbinafine 250 mg daily. Completed treatment 3/2017  - s/p ketoconazole 2% cream daily    Encounter Date: Aug 7, 2017   Chief Complaint   Patient presents with     Derm Problem     Psoriasis follow up, Minh states \" It has its ups and downs.\"     Psoriasis     History of Present Illness:   This is a 31 year old male who presents to dermatology clinic for a follow up for onychomycosis and psoriasis. Today the patient reports that he had insurance issues so he was off of the Humira for 2 months starting in May and had a few small flares, but overall his skin was okay. He started again in the beginning of July. He always uses the triamcinolone to keep a base on his skin. He can  well and has no problems with joints. He also reports that he nails are doing well and they are growing relatively normal again. He is happy about this. The patient reports no other lesions of concern at this time.     Otherwise, the patient reports no painful, bleeding, nonhealing, or pruritic lesions, and denies new or changing moles.    Past Medical History:   Allergies as of    Diagnosis     Psoriatic arthropathy (H)     Clavus     Backache     Hyperkeratosis of palms and soles     Psoriasis     Encounter for long-term (current) use of high-risk medication     Social History:   The patient use to work for [employer] and [employer]. He now works as a supervisor position, managing a team.    Medications:   Current Outpatient Prescriptions   Medication     adalimumab (HUMIRA PEN) 40 MG/0.8ML pen kit     ketoconazole (NIZORAL) 2 % cream     terbinafine (LAMISIL) 250 MG tablet     vitamin D (ERGOCALCIFEROL) 29800 " UNIT capsule     naproxen (NAPROSYN) 500 MG tablet     fluocinonide (LIDEX) 0.05 % ointment     No current facility-administered medications for this visit.      Allergies as of 08/07/2017 - Gonzalez as Reviewed 08/07/2017   Allergen Reaction Noted     Bees Swelling 05/05/2014     Nkda [no known drug allergies]  01/31/2013     Review of Systems:   Patient reports otherwise feeling well and denies any other skin concerns.  - He denies fever, chronic cough, rashes, swollen lymph nodes, headaches, or numbness    Physical exam:   GEN: Well appearing male in no acute distress alert and oriented to time, person, place, and situation.  SKIN: Skin examination of the face, scalp, chest,abdomen, back, arms including hands as well as feet  was performed. Significant for:   - Rare scaly papule to trunk and extremities  - Scattered hyperemic macules to trunk and extremities from previous eruptions   - Minimal pitting to fingernails  - Toenails are thickened but no longer yellow  - No other lesions of concern on areas examined    Assessment and Plan:   1. Plaque psoriasis and psoriatic arthritis, well controlled:   - Continue Humira injections every other week as directed. Discussed calling the office if any concerning side effects or fevers etc.   -CBC, CMP were checked today, wnl, M Tuberculosis Quant was checked as well, negative. Will recheck these annually.     - Continue triamcinolone ointment - apply twice daily to affected areas on the arms, legs and trunk as needed until improved.  - Continue desonide ointment - apply twice daily to affected areas on the face as needed until improved.    2. HX of Onychomycosis and 3 months of terbinafine treatment.  - Stop terbinafine.   -Continue ketoconazole 2% cream - appydaily in between toes and bottoms of feet, to prevent recurrence of t pedis or onychomycosis.     Follow up in 6 months, earlier for new or changing lesions.    Staff Involved:  Mahinibraphael Disclosure:   Elisa CARABALLO, am  serving as a scribe to document services personally performed by Zakiya Caal PA-C, based on data collection and the provider's statements to me.  Provider Disclosure:   The documentation recorded by the scribe accurately reflects the services I personally performed and the decisions made by me.    All risks, benefits and alternatives were discussed with patient.  Patient is in agreement and understands the assessment and plan.  All questions were answered.  Sun Screen Education was given.   Return to Clinic in 6 months or sooner as needed.   Zakiya Caal PA-C   [facility] Dermatology Clinic

## 2017-08-08 LAB
M TB TUBERC IFN-G BLD QL: NEGATIVE
M TB TUBERC IFN-G/MITOGEN IGNF BLD: 0 IU/ML

## 2017-08-09 DIAGNOSIS — L40.9 PSORIASIS: ICD-10-CM

## 2018-03-08 ENCOUNTER — TELEPHONE (OUTPATIENT)
Dept: DERMATOLOGY | Facility: CLINIC | Age: 33
End: 2018-03-08

## 2018-03-08 NOTE — TELEPHONE ENCOUNTER
PA Initiation    Medication: humira-inititation  Insurance Company:    [facility] [place], [place] - [facility] [address]  Filling Pharmacy Phone:    Filling Pharmacy Fax:    Start Date: 3/8/2018      [facility] Authorization Team   Phone: [phone]  Fax: [phone]

## 2018-03-12 NOTE — TELEPHONE ENCOUNTER
PA approved.  Effective date: 03/12/2018-03/31/2018  PA reference #: [id number]  Pt. notified:   Yes   Pt. informed directly.    TERRY [facility] Prior Authorization Team   Phone: [phone]  Fax: [phone]

## 2018-08-15 DIAGNOSIS — L40.9 PSORIASIS: ICD-10-CM

## 2018-08-16 DIAGNOSIS — Z79.899 ENCOUNTER FOR LONG-TERM (CURRENT) USE OF HIGH-RISK MEDICATION: Primary | ICD-10-CM

## 2018-08-16 NOTE — TELEPHONE ENCOUNTER
Patient has not been seen for a year. He requires an appointment before refills will be given. He also needs to have a quantgold, CBC, and CMP checked for routine lab monitoring. These labs have been ordered.

## 2018-08-16 NOTE — TELEPHONE ENCOUNTER
Medication requested: adalimumab (HUMIRA PEN) 40 MG/0.8ML   Last refilled: 8/9/17  Qty: 2 EACH :6    Last seen: 8/7/17  RTC: 6 MOS  Next appt: NONE  Scheduling has been notified to contact the pt for appointment    *Routing refill request to provider for review/approval because:  Drug not on the refill protocol

## 2018-10-10 ENCOUNTER — OFFICE VISIT (OUTPATIENT)
Dept: DERMATOLOGY | Facility: CLINIC | Age: 33
End: 2018-10-10
Payer: COMMERCIAL

## 2018-10-10 DIAGNOSIS — L40.9 PSORIASIS: ICD-10-CM

## 2018-10-10 DIAGNOSIS — Z86.19 HISTORY OF TINEA PEDIS: Primary | ICD-10-CM

## 2018-10-10 DIAGNOSIS — L40.50 PSORIATIC ARTHRITIS (H): ICD-10-CM

## 2018-10-10 DIAGNOSIS — Z79.899 ENCOUNTER FOR LONG-TERM (CURRENT) USE OF HIGH-RISK MEDICATION: ICD-10-CM

## 2018-10-10 LAB
ALBUMIN SERPL-MCNC: 4.1 G/DL (ref 3.4–5)
ALP SERPL-CCNC: 74 U/L (ref 40–150)
ALT SERPL W P-5'-P-CCNC: 18 U/L (ref 0–70)
ANION GAP SERPL CALCULATED.3IONS-SCNC: 4 MMOL/L (ref 3–14)
AST SERPL W P-5'-P-CCNC: 13 U/L (ref 0–45)
BASOPHILS # BLD AUTO: 0 10E9/L (ref 0–0.2)
BASOPHILS NFR BLD AUTO: 0.6 %
BILIRUB SERPL-MCNC: 0.3 MG/DL (ref 0.2–1.3)
BUN SERPL-MCNC: 14 MG/DL (ref 7–30)
CALCIUM SERPL-MCNC: 9.1 MG/DL (ref 8.5–10.1)
CHLORIDE SERPL-SCNC: 105 MMOL/L (ref 94–109)
CO2 SERPL-SCNC: 31 MMOL/L (ref 20–32)
CREAT SERPL-MCNC: 0.93 MG/DL (ref 0.66–1.25)
DIFFERENTIAL METHOD BLD: NORMAL
EOSINOPHIL # BLD AUTO: 0.1 10E9/L (ref 0–0.7)
EOSINOPHIL NFR BLD AUTO: 2 %
ERYTHROCYTE [DISTWIDTH] IN BLOOD BY AUTOMATED COUNT: 13.2 % (ref 10–15)
GFR SERPL CREATININE-BSD FRML MDRD: >90 ML/MIN/1.7M2
GLUCOSE SERPL-MCNC: 83 MG/DL (ref 70–99)
HCT VFR BLD AUTO: 47.6 % (ref 40–53)
HGB BLD-MCNC: 16.2 G/DL (ref 13.3–17.7)
IMM GRANULOCYTES # BLD: 0 10E9/L (ref 0–0.4)
IMM GRANULOCYTES NFR BLD: 0.1 %
LYMPHOCYTES # BLD AUTO: 2.7 10E9/L (ref 0.8–5.3)
LYMPHOCYTES NFR BLD AUTO: 38.8 %
MCH RBC QN AUTO: 30.4 PG (ref 26.5–33)
MCHC RBC AUTO-ENTMCNC: 34 G/DL (ref 31.5–36.5)
MCV RBC AUTO: 89 FL (ref 78–100)
MONOCYTES # BLD AUTO: 0.6 10E9/L (ref 0–1.3)
MONOCYTES NFR BLD AUTO: 8.9 %
NEUTROPHILS # BLD AUTO: 3.5 10E9/L (ref 1.6–8.3)
NEUTROPHILS NFR BLD AUTO: 49.6 %
NRBC # BLD AUTO: 0 10*3/UL
NRBC BLD AUTO-RTO: 0 /100
PLATELET # BLD AUTO: 260 10E9/L (ref 150–450)
POTASSIUM SERPL-SCNC: 4.2 MMOL/L (ref 3.4–5.3)
PROT SERPL-MCNC: 7.4 G/DL (ref 6.8–8.8)
RBC # BLD AUTO: 5.33 10E12/L (ref 4.4–5.9)
SODIUM SERPL-SCNC: 139 MMOL/L (ref 133–144)
WBC # BLD AUTO: 7 10E9/L (ref 4–11)

## 2018-10-10 RX ORDER — NAPROXEN 500 MG/1
500 TABLET ORAL 2 TIMES DAILY WITH MEALS
Qty: 60 TABLET | Refills: 3 | Status: SHIPPED | OUTPATIENT
Start: 2018-10-10 | End: 2023-09-21

## 2018-10-10 RX ORDER — KETOCONAZOLE 20 MG/G
CREAM TOPICAL DAILY
Qty: 60 G | Refills: 11 | Status: SHIPPED | OUTPATIENT
Start: 2018-10-10 | End: 2023-09-21

## 2018-10-10 NOTE — MR AVS SNAPSHOT
After Visit Summary   10/10/2018    Minh Pereira    MRN: [id number]           Patient Information     Date Of Birth          1985        Visit Information        Provider Department      10/10/2018 12:15 PM Zakiya Caal PA-C [facility] Dermatology        Today's Diagnoses     History of tinea pedis    -  1    Psoriasis        Psoriatic arthritis (H)           Follow-ups after your visit        Your next 10 appointments already scheduled     Oct 10, 2018  1:45 PM CDT   LAB with [facility] ([facility] [place] [facility] [place])    [address] [place] [id number] [phone]           Please do not eat 10-12 hours before your appointment if you are coming in fasting for labs on lipids, cholesterol, or glucose (sugar). This does not apply to pregnant women. Water, hot tea and black coffee (with nothing added) are okay. Do not drink other fluids, diet soda or chew gum.              Who to contact     Please call your clinic at [phone] to:    Ask questions about your health    Make or cancel appointments    Discuss your medicines    Learn about your test results    Speak to your doctor            Additional Information About Your Visit        MyChart Information     [employer] is an electronic gateway that provides easy, online access to your medical records. With [employer], you can request a clinic appointment, read your test results, renew a prescription or communicate with your care team.     To sign up for "[employer]t visit the website at [email or web address]   You will be asked to enter the access code listed below, as well as some personal information. Please follow the directions to create your username and password.     Your access code is: [email or web address]  Expires: 2018  4:25 PM     Your access code will  in 90 days. If you need help or a new code, please contact your [facility] or call  [phone] for assistance.        Care EveryWhere ID     This is your Care EveryWhere ID. This could be used by other organizations to access your [place] medical records  [id number]         Blood Pressure from Last 3 Encounters:   08/05/16 117/73   04/06/16 118/72   02/05/16 139/62    Weight from Last 3 Encounters:   08/05/16 74.6 kg (164 lb 6.4 oz)   04/06/16 78.4 kg (172 lb 12.8 oz)   02/05/16 78.5 kg (173 lb)              Today, you had the following     No orders found for display         Where to get your medicines      These medications [facility] sent to [place] [facility] [place] [address] [address] [facility]  [address] [facility] [address]    Hours:  Mon-Fri 8:30am-5:00pm Toll Free [phone] Phone:  [phone]     ketoconazole 2 % cream    naproxen 500 MG tablet         Call your pharmacy to confirm that your medication is ready for pickup. It may take up to 24 hours for them to receive the prescription. If the prescription is not ready within 3 business days, please contact your clinic or your provider.     We will let you know when these medications are ready. If you don't hear back within 3 business days, please contact us.     adalimumab 40 MG/0.8ML pen kit          Primary Care Provider    [facility]       No address on file        Equal Access to Services     CHRIS ROBERTO AH: Hadii darren Flores, waaxda lutremaine, qaybta kaalrehana fajardo . So [facility] [phone].    ATENCIÓN: Si habla español, tiene a grigsby disposición servicios gratuitos de asistencia lingüística. Camila al [phone].    We comply with applicable federal civil rights laws and [place] laws. We do not discriminate on the basis of race, color, national origin, age, disability, sex, sexual orientation, or gender identity.            Thank you!     Thank you for choosing [facility]  for your care. Our goal is always to  provide you with excellent care. Hearing back from our patients is one way we can continue to improve our services. Please take a few minutes to complete the written survey that you may receive in the mail after your visit with us. Thank you!             Your Updated Medication List - Protect others around you: Learn how to safely use, store and throw away your medicines at www.disposemymeds.org.          This list is accurate as of 10/10/18 12:34 PM.  Always use your most recent med list.                   Brand Name Dispense Instructions for use Diagnosis    adalimumab 40 MG/0.8ML pen kit    HUMIRA PEN    2 each    Inject 0.8 mLs (40 mg) Subcutaneous every 14 days    Psoriasis       fluocinonide 0.05 % ointment    LIDEX    60 g    Apply topically 2 times daily    Psoriasis       ketoconazole 2 % cream    NIZORAL    60 g    Apply topically daily In between toes and bottoms of feet.        naproxen 500 MG tablet    NAPROSYN    60 tablet    Take 1 tablet (500 mg) by mouth 2 times daily (with meals)    Psoriatic arthritis (H)       triamcinolone 0.1 % ointment    KENALOG    453 g    Apply topically 2 times daily as needed for irritation To affected areas, avoiding groin and axilla.    Psoriasis       vitamin D 88682 UNIT capsule    ERGOCALCIFEROL    12 capsule    Take 1 capsule (50,000 Units) by mouth every 7 days    Psoriatic arthritis (H)

## 2018-10-10 NOTE — PROGRESS NOTES
[facility] Dermatology Clinic Note     Dermatology Problem List:  1. Plaque psoriasis and psoriatic arthritis: Many years of disease.   - s/p methotrexate, failed Enbrel/flared on enbrel  - on Humira 40 mg Q 2 weeks, triamcinolone ointment, desonide ointment  2. Onychomycosis. culture 08/11/2016, on terbinafine 250 mg daily. Completed treatment 3/2017  - s/p ketoconazole 2% cream daily    Encounter Date: Oct 10, 2018   Chief Complaint   Patient presents with     Derm Problem     Psoriasis follow up, Minh notes he is doing well.      History of Present Illness:   This is a 33 year old male who presents to dermatology clinic for a follow up for onychomycosis and psoriasis. The patient was last seen in the dermatology clinic on 08/07/17 during which he stopped terbinafine after 3 months regarding his onychomycosis and blood monitoring labs (CBC, CMP, TB quant) were obtained as he continued Humira for his psoriasis. All labs found wnl.     Today he reports that his psoriasis is doing well. He only has some small patches on his lower legs. He has been diligent with maintaining his Humira routine. He does not need to use topical medications.     Otherwise, the patient reports no painful, bleeding, nonhealing, or pruritic lesions, and denies new or changing moles. He admits to occasional, intermittent joint pains in his shoulders, knees and feet. He needs to take naproxen 500 mg intermittently for the discomfort. He admits he had been working two full time jobs in the last 2 months, making his body more sore.     Past Medical History:   Allergies as of    Diagnosis     Psoriatic arthropathy (H)     Clavus     Backache     Hyperkeratosis of palms and soles     Psoriasis     Encounter for long-term (current) use of high-risk medication     Social History:   The patient use to work for [employer] and [employer]. He now works as a supervisor position, managing a team.    Medications:   Current  Outpatient Prescriptions   Medication     adalimumab (HUMIRA PEN) 40 MG/0.8ML pen kit     fluocinonide (LIDEX) 0.05 % ointment     ketoconazole (NIZORAL) 2 % cream     naproxen (NAPROSYN) 500 MG tablet     triamcinolone (KENALOG) 0.1 % ointment     vitamin D (ERGOCALCIFEROL) 85052 UNIT capsule     No current facility-administered medications for this visit.      Allergies as of 10/10/2018 - Gonzalez as Reviewed 10/10/2018   Allergen Reaction Noted     Bees Swelling 05/05/2014     Nkda [no known drug allergies]  01/31/2013     Review of Systems:   Skin: Patient reports otherwise feeling well and denies any other skin concerns.  Constitutional:  He denies fever, chronic cough, rashes, swollen lymph nodes, headaches, or numbness. He is generally feeling well.     Physical exam:   GEN: Well appearing male in no acute distress alert and oriented to time, person, place, and situation.  SKIN: Skin examination of the face, scalp, legs, arms including hands as well as feet  was performed. Defers further exam. Significant for:     - there are a few thin scaly papules on lower legs  - there is mild scaling between 4th and 5th digits on right foot.   - Mild hyperkeratosis to bilateral palms   - No other lesions of concern on areas examined    Assessment and Plan:   1. Plaque psoriasis and psoriatic arthritis, well controlled:   - Continue Humira injections every other week as directed. Discussed calling the office if any concerning side effects or fevers etc.   -Labs ordered today: CBC, CMP, M Tuberculosis Quant. Will recheck these annually.   - Continue triamcinolone ointment - apply twice daily to affected areas on the arms, legs and trunk as needed until improved.  - Continue desonide ointment - apply twice daily to affected areas on the face as needed until improved.  - Pt to seek rheumatology if joint pain persists and becomes bothersome  -Continue naproxen 500 mg BID. Pt reminded to take medication with meals.    2. Mild  tinea pedis, HX of Onychomycosis and 3 months of terbinafine treatment.     -Continue ketoconazole 2% cream - appydaily in between toes and bottoms of feet, to prevent recurrence of t pedis or onychomycosis.     Follow up annually, earlier for new or changing lesions.    Staff Involved:  Scribe/Staff    Scribe Disclosure:   I, Sara Church, am serving as a scribe to document services personally performed by Zakiya Caal PA-C, based on data collection and the provider's statements to me.    Provider Disclosure:   The documentation recorded by the scribe accurately reflects the services I personally performed and the decisions made by me.    All risks, benefits and alternatives were discussed with patient.  Patient is in agreement and understands the assessment and plan.  All questions were answered.  Sun Screen Education was given.   Return to Clinic annually or sooner as needed.   Zakiya Caal PA-C   [facility]

## 2018-10-12 LAB
GAMMA INTERFERON BACKGROUND BLD IA-ACNC: 0.04 IU/ML
M TB IFN-G BLD-IMP: NEGATIVE
M TB IFN-G CD4+ BCKGRND COR BLD-ACNC: >10 IU/ML
MITOGEN IGNF BCKGRD COR BLD-ACNC: 0.01 IU/ML
MITOGEN IGNF BCKGRD COR BLD-ACNC: 0.01 IU/ML

## 2020-09-29 ENCOUNTER — TELEPHONE (OUTPATIENT)
Dept: DERMATOLOGY | Facility: CLINIC | Age: 35
End: 2020-09-29

## 2020-09-29 ENCOUNTER — OFFICE VISIT (OUTPATIENT)
Dept: DERMATOLOGY | Facility: CLINIC | Age: 35
End: 2020-09-29
Payer: COMMERCIAL

## 2020-09-29 DIAGNOSIS — L40.9 PSORIASIS: Primary | ICD-10-CM

## 2020-09-29 DIAGNOSIS — Z51.81 ENCOUNTER FOR THERAPEUTIC DRUG MONITORING: ICD-10-CM

## 2020-09-29 DIAGNOSIS — L40.50 PSORIATIC ARTHRITIS (H): ICD-10-CM

## 2020-09-29 DIAGNOSIS — L40.9 PSORIASIS: ICD-10-CM

## 2020-09-29 LAB
ALBUMIN SERPL-MCNC: 3.6 G/DL (ref 3.4–5)
ALP SERPL-CCNC: 76 U/L (ref 40–150)
ALT SERPL W P-5'-P-CCNC: 20 U/L (ref 0–70)
AST SERPL W P-5'-P-CCNC: 12 U/L (ref 0–45)
BASOPHILS # BLD AUTO: 0 10E9/L (ref 0–0.2)
BASOPHILS NFR BLD AUTO: 0.5 %
BILIRUB DIRECT SERPL-MCNC: 0.1 MG/DL (ref 0–0.2)
BILIRUB SERPL-MCNC: 0.3 MG/DL (ref 0.2–1.3)
DIFFERENTIAL METHOD BLD: NORMAL
EOSINOPHIL # BLD AUTO: 0.3 10E9/L (ref 0–0.7)
EOSINOPHIL NFR BLD AUTO: 4.6 %
ERYTHROCYTE [DISTWIDTH] IN BLOOD BY AUTOMATED COUNT: 14.4 % (ref 10–15)
HBV CORE AB SERPL QL IA: NONREACTIVE
HBV SURFACE AB SERPL IA-ACNC: >1000 M[IU]/ML
HBV SURFACE AG SERPL QL IA: NONREACTIVE
HCT VFR BLD AUTO: 45.3 % (ref 40–53)
HGB BLD-MCNC: 15.4 G/DL (ref 13.3–17.7)
HIV 1+2 AB+HIV1 P24 AG SERPL QL IA: NONREACTIVE
IMM GRANULOCYTES # BLD: 0 10E9/L (ref 0–0.4)
IMM GRANULOCYTES NFR BLD: 0.5 %
LYMPHOCYTES # BLD AUTO: 2.2 10E9/L (ref 0.8–5.3)
LYMPHOCYTES NFR BLD AUTO: 35.2 %
MCH RBC QN AUTO: 31 PG (ref 26.5–33)
MCHC RBC AUTO-ENTMCNC: 34 G/DL (ref 31.5–36.5)
MCV RBC AUTO: 91 FL (ref 78–100)
MONOCYTES # BLD AUTO: 0.4 10E9/L (ref 0–1.3)
MONOCYTES NFR BLD AUTO: 7 %
NEUTROPHILS # BLD AUTO: 3.3 10E9/L (ref 1.6–8.3)
NEUTROPHILS NFR BLD AUTO: 52.2 %
NRBC # BLD AUTO: 0 10*3/UL
NRBC BLD AUTO-RTO: 0 /100
PLATELET # BLD AUTO: 256 10E9/L (ref 150–450)
PROT SERPL-MCNC: 7.2 G/DL (ref 6.8–8.8)
RBC # BLD AUTO: 4.97 10E12/L (ref 4.4–5.9)
WBC # BLD AUTO: 6.3 10E9/L (ref 4–11)

## 2020-09-29 NOTE — TELEPHONE ENCOUNTER
PA Initiation    Medication: Humira 80mg/0.8ml and 40mg/0.8ml starter -PA Initiated  Insurance Company: [employer] - Phone [phone] Fax [phone]  [facility] [place] [place] [facility] [address]  Filling Pharmacy Phone:    Filling Pharmacy Fax:    Start Date: 9/29/2020      PA saved in CMM, need chart notes completed to submit this request.

## 2020-09-29 NOTE — PATIENT INSTRUCTIONS
We will check blood work today and make sure it's safe to start Humira.    Assuming blood work is okay, we will resume Humira.    See hand out below.    Return in 2 months via phone, sooner if concerns.

## 2020-09-29 NOTE — LETTER
9/29/2020       RE: Minh Pereira  [address]  [address]  [facility] [place] [address]     Dear Colleague,    Thank you for referring your patient, Minh Pereira, to the [facility] DERMATOLOGY at [facility]. Please see a copy of my visit note below.    [facility] Dermatology Note      Dermatology Problem List:  1. Plaque psoriasis and psoriatic arthritis: Many years of disease.   - Has done well on Humira, initially started ~2016  - s/p methotrexate, failed Enbrel/flared on enbrel  - Pertinent PMH of hep C s/p treatment; cleared HCV RNA in 2016  - Topicals: triamcinolone ointment, desonide ointment  2. Onychomycosis. culture 08/11/2016, s/p terbinafine 250 mg daily 2017.    CC:   Chief Complaint   Patient presents with     Psoriasis     Minh is here today for a follow up for his psoriasis and also would like a medicationrefill          Encounter Date: Sep 29, 2020    History of Present Illness:  Mr. Minh Pereira is a 35 year old male who presents as a follow-up for psoriasis. The patient was last seen 2018 when he was continued on Humira. Since last visit, doing well. Had moved to [place] for 1.5 years but is now back. During that time, he continued Humira with good results. He has now been off Humira since May/June 2020 and having a flare. He also has psoriatic arthritis. Most of involvement on legs, back, chest, abdomen. Uses shea butter oil, no topical steroids. While on Humira, had no issues.    Otherwise feeling well, no additional skin concerns.     Past Medical History:   Patient Active Problem List   Diagnosis     Psoriatic arthropathy (H)     Clavus     Backache     Hyperkeratosis of palms and soles     Psoriasis     Encounter for long-term (current) use of high-risk medication     Past Medical History:   Diagnosis Date     Arthritis     psoriatic arthritis     Hepatitis C      History of hepatitis C     SVR 12/16/16     Psoriasis       Psoriasis      Psoriatic arthritis (H)      History reviewed. No pertinent surgical history.    Social History:  Patient reports that he has been smoking cigarettes. He has a 4.00 pack-year smoking history. He has never used smokeless tobacco. He reports current alcohol use. He reports current drug use. Drug: Marijuana.    Family History:  Family History   Problem Relation Age of Onset     Arthritis Mother         OA, no FHx of PsA or psoriasis     Cancer No family hx of         No family history of skin cancer     Melanoma No family hx of      Skin Cancer No family hx of        Medications:  Current Outpatient Medications   Medication Sig Dispense Refill     adalimumab (HUMIRA *CF*) 40 MG/0.4ML pen kit Inject 0.4 mLs (40 mg) Subcutaneous every 14 days 0.8 mL 2     adalimumab (HUMIRA *CF*) 80 MG/0.8ML pen kit Inject 0.8 mLs (80 mg) Subcutaneous See Admin Instructions for 1 dose 0.8 mL 0     adalimumab (HUMIRA PEN) 40 MG/0.8ML pen kit Inject 0.8 mLs (40 mg) Subcutaneous every 14 days 2 each 11     ketoconazole (NIZORAL) 2 % cream Apply topically daily In between toes and bottoms of feet. 60 g 11     naproxen (NAPROSYN) 500 MG tablet Take 1 tablet (500 mg) by mouth 2 times daily (with meals) 60 tablet 3     fluocinonide (LIDEX) 0.05 % ointment Apply topically 2 times daily (Patient not taking: Reported on 10/10/2018) 60 g 1     triamcinolone (KENALOG) 0.1 % ointment Apply topically 2 times daily as needed for irritation To affected areas, avoiding groin and axilla. (Patient not taking: Reported on 10/10/2018) 453 g 2     vitamin D (ERGOCALCIFEROL) 19076 UNIT capsule Take 1 capsule (50,000 Units) by mouth every 7 days (Patient not taking: Reported on 10/10/2018) 12 capsule 0     Allergies   Allergen Reactions     Bees Swelling     Nkda [No Known Drug Allergies]          Review of Systems:  -Constitutional: Otherwise feeling well today, in usual state of health.  -Skin: As above in HPI. No additional skin  concerns.    Physical exam:  GEN: This is a well developed, well-nourished male in no acute distress, in a pleasant mood.    SKIN: Focused examination of the abdomen, lower back, and shins was performed.  -Barreto skin type: V  -Multiple round hyperpigmented to pink scaly papules scattered on above locations.  -No other lesions of concern on areas examined.     Impression/Plan:  1. Psoriasis with psoriatic arthritis, previously well-controlled on Humira but now flaring after being off for 3-4 months. Will resume. He is due for lab monitoring today. Offered topicals but not interested today, he will let me know if changes mind.  - Resume Humira  - Labs today: CBC, hepatic panel, hep B, HIV, quant gold  - Has h/o treated hepatitis C with negative HCV RNA in 2016    Follow-up in 2 months, earlier for new or changing lesions.       Staff Involved:  Staff Only    Michaelle Diana MD    Department of Dermatology  [facility] [place]: Phone: [phone], Fax: [phone]  9/29/2020

## 2020-09-29 NOTE — PROGRESS NOTES
[facility] Dermatology Note      Dermatology Problem List:  1. Plaque psoriasis and psoriatic arthritis: Many years of disease.   - Has done well on Humira, initially started ~2016  - s/p methotrexate, failed Enbrel/flared on enbrel  - Pertinent PMH of hep C s/p treatment; cleared HCV RNA in 2016  - Topicals: triamcinolone ointment, desonide ointment  2. Onychomycosis. culture 08/11/2016, s/p terbinafine 250 mg daily 2017.    CC:   Chief Complaint   Patient presents with     Psoriasis     Minh is here today for a follow up for his psoriasis and also would like a medicationrefill          Encounter Date: Sep 29, 2020    History of Present Illness:  Mr. Minh Pereira is a 35 year old male who presents as a follow-up for psoriasis. The patient was last seen 2018 when he was continued on Humira. Since last visit, doing well. Had moved to [place] for 1.5 years but is now back. During that time, he continued Humira with good results. He has now been off Humira since May/June 2020 and having a flare. He also has psoriatic arthritis. Most of involvement on legs, back, chest, abdomen. Uses shea butter oil, no topical steroids. While on Humira, had no issues.    Otherwise feeling well, no additional skin concerns.     Past Medical History:   Patient Active Problem List   Diagnosis     Psoriatic arthropathy (H)     Clavus     Backache     Hyperkeratosis of palms and soles     Psoriasis     Encounter for long-term (current) use of high-risk medication     Past Medical History:   Diagnosis Date     Arthritis     psoriatic arthritis     Hepatitis C      History of hepatitis C     SVR 12/16/16     Psoriasis      Psoriasis      Psoriatic arthritis (H)      History reviewed. No pertinent surgical history.    Social History:  Patient reports that he has been smoking cigarettes. He has a 4.00 pack-year smoking history. He has never used smokeless tobacco. He reports current alcohol use. He reports current  drug use. Drug: Marijuana.    Family History:  Family History   Problem Relation Age of Onset     Arthritis Mother         OA, no FHx of PsA or psoriasis     Cancer No family hx of         No family history of skin cancer     Melanoma No family hx of      Skin Cancer No family hx of        Medications:  Current Outpatient Medications   Medication Sig Dispense Refill     adalimumab (HUMIRA *CF*) 40 MG/0.4ML pen kit Inject 0.4 mLs (40 mg) Subcutaneous every 14 days 0.8 mL 2     adalimumab (HUMIRA *CF*) 80 MG/0.8ML pen kit Inject 0.8 mLs (80 mg) Subcutaneous See Admin Instructions for 1 dose 0.8 mL 0     adalimumab (HUMIRA PEN) 40 MG/0.8ML pen kit Inject 0.8 mLs (40 mg) Subcutaneous every 14 days 2 each 11     ketoconazole (NIZORAL) 2 % cream Apply topically daily In between toes and bottoms of feet. 60 g 11     naproxen (NAPROSYN) 500 MG tablet Take 1 tablet (500 mg) by mouth 2 times daily (with meals) 60 tablet 3     fluocinonide (LIDEX) 0.05 % ointment Apply topically 2 times daily (Patient not taking: Reported on 10/10/2018) 60 g 1     triamcinolone (KENALOG) 0.1 % ointment Apply topically 2 times daily as needed for irritation To affected areas, avoiding groin and axilla. (Patient not taking: Reported on 10/10/2018) 453 g 2     vitamin D (ERGOCALCIFEROL) 17919 UNIT capsule Take 1 capsule (50,000 Units) by mouth every 7 days (Patient not taking: Reported on 10/10/2018) 12 capsule 0     Allergies   Allergen Reactions     Bees Swelling     Nkda [No Known Drug Allergies]          Review of Systems:  -Constitutional: Otherwise feeling well today, in usual state of health.  -Skin: As above in HPI. No additional skin concerns.    Physical exam:  GEN: This is a well developed, well-nourished male in no acute distress, in a pleasant mood.    SKIN: Focused examination of the abdomen, lower back, and shins was performed.  -Barreto skin type: V  -Multiple round hyperpigmented to pink scaly papules scattered on above  locations.  -No other lesions of concern on areas examined.     Impression/Plan:  1. Psoriasis with psoriatic arthritis, previously well-controlled on Humira but now flaring after being off for 3-4 months. Will resume. He is due for lab monitoring today. Offered topicals but not interested today, he will let me know if changes mind.  - Resume Humira  - Labs today: CBC, hepatic panel, hep B, HIV, quant gold  - Has h/o treated hepatitis C with negative HCV RNA in 2016    Follow-up in 2 months, earlier for new or changing lesions.       Staff Involved:  Staff Only    Michaelle Diana MD    Department of Dermatology  [facility]: Phone: [phone], Fax: [phone]  9/29/2020

## 2020-09-29 NOTE — NURSING NOTE
Dermatology Rooming Note    Minh Pereira's goals for this visit include:   Chief Complaint   Patient presents with     Psoriasis     Minh is here today for a follow up for his psoriasis and also would like a medicationrefill      MARINA Renae

## 2020-09-30 LAB
GAMMA INTERFERON BACKGROUND BLD IA-ACNC: 0.08 IU/ML
M TB IFN-G CD4+ BCKGRND COR BLD-ACNC: 9.92 IU/ML
M TB TUBERC IFN-G BLD QL: NEGATIVE
MITOGEN IGNF BCKGRD COR BLD-ACNC: 0 IU/ML
MITOGEN IGNF BCKGRD COR BLD-ACNC: 0 IU/ML

## 2020-10-01 NOTE — TELEPHONE ENCOUNTER
Prior Authorization Approval    Authorization Effective Date: 9/30/2020  Authorization Expiration Date: 9/30/2021  Medication: Humira 80mg/0.8ml and 40mg/0.8ml starter -PA approved  Approved Dose/Quantity: 3/28ds, then 2/28ds thereafter  Reference #:   n/a  Insurance Company: [employer] - Phone [phone] Fax [phone]  Expected CoPay:     Unknown, but PMAP plan so will be affordable  CoPay Card Available:  N/A    Foundation Assistance Needed: No    Which Pharmacy is filling the prescription (Not needed for infusion/clinic administered): [facility] - [place], [place] - [address] [place] [address]  Pharmacy Notified: Yes  Patient Notified: No- had to  for pt

## 2020-10-15 DIAGNOSIS — L40.9 PSORIASIS: ICD-10-CM

## 2020-10-15 DIAGNOSIS — L40.50 PSORIATIC ARTHRITIS (H): ICD-10-CM

## 2020-10-15 NOTE — TELEPHONE ENCOUNTER
[facility] Call Center    Phone Message    May a detailed message be left on voicemail: yes     Reason for Call: Other: The pt has questions about how to take Humira and if another rx needs to go the pharmacy. Please call the pt to discuss. Thanks.    Action Taken: Message routed to:  [facility] ([facility]    Travel Screening: Not Applicable

## 2020-10-15 NOTE — TELEPHONE ENCOUNTER
Minh wanted to make sure he still had a prescription for Humira on file at the pharmacy.    I called the pharmacy and they note that he does not have refills. (in our system it looks as if he does)    Will route to Dr. Diana to send a new Humira prescription.

## 2020-11-10 ENCOUNTER — VIRTUAL VISIT (OUTPATIENT)
Dept: DERMATOLOGY | Facility: CLINIC | Age: 35
End: 2020-11-10
Payer: COMMERCIAL

## 2020-11-10 DIAGNOSIS — L40.9 PSORIASIS: Primary | ICD-10-CM

## 2020-11-10 DIAGNOSIS — L40.50 PSORIATIC ARTHRITIS (H): ICD-10-CM

## 2020-11-10 PROCEDURE — 99441 PR PHYSICIAN TELEPHONE EVALUATION 5-10 MIN: CPT | Mod: 95 | Performed by: DERMATOLOGY

## 2020-11-10 ASSESSMENT — PAIN SCALES - GENERAL: PAINLEVEL: NO PAIN (0)

## 2020-11-10 NOTE — PROGRESS NOTES
[facility] Dermatology Record:  Store and Forward and Telephone see chart      Dermatology Problem List:  1. Plaque psoriasis and psoriatic arthritis: Many years of disease.   - Has done well on Humira, initially started ~2016, resumed 9/2020  - s/p methotrexate, failed Enbrel/flared on enbrel  - Pertinent PMH of hep C s/p treatment; cleared HCV RNA in 2016  - Topicals: triamcinolone ointment, desonide ointment  2. Onychomycosis. culture 08/11/2016, s/p terbinafine 250 mg daily 2017.    Encounter Date: Nov 10, 2020    CC:   Chief Complaint   Patient presents with     Derm Problem     Plaque psoriasis and psoriatic arthritis - Minh states he has been doing great        History of Present Illness:  Minh Pereira is a 35 year old male who presents for psoriasis and psoriatic arthritis.    We saw him in September 2020 at which plan was to resume Humira.  6 weeks ago he started Humira. No issues.  Skin much better, joints/muscles improved too.  Mostly it's clear, still a lot spots here and there.  Forehead and face still have some areas, other problems areas.    No infections, no injection site reactions.    [place]    ROS: Patient is generally feeling well today.    Physical Examination:  General: Mood pleasant, answers questions appropriately, interactive.   Skin: Focused examination including back, chest, abdomen, and legs was performed.   -examined skin fairly clear - some hyperpigmented patches on legs.             Labs:  Reviewed - 9/29/2020.  Quant gold negative.  CBC, hepatic panel wnl.  Hep B surface ab positive, hep b surface antigen and hep b core antibody negative.    Past Medical History:   Patient Active Problem List   Diagnosis     Psoriatic arthropathy (H)     Clavus     Backache     Hyperkeratosis of palms and soles     Psoriasis     Encounter for long-term (current) use of high-risk medication     Past Medical History:   Diagnosis Date     Arthritis     psoriatic arthritis      Hepatitis C      History of hepatitis C     SVR 12/16/16     Psoriasis      Psoriasis      Psoriatic arthritis (H)      No past surgical history on file.    Social History:  Patient reports that he has been smoking cigarettes. He has a 4.00 pack-year smoking history. He has never used smokeless tobacco. He reports current alcohol use. He reports current drug use. Drug: Marijuana.    Family History:  Family History   Problem Relation Age of Onset     Arthritis Mother         OA, no FHx of PsA or psoriasis     Cancer No family hx of         No family history of skin cancer     Melanoma No family hx of      Skin Cancer No family hx of        Medications:  Current Outpatient Medications   Medication     adalimumab (HUMIRA *CF*) 40 MG/0.4ML pen kit     adalimumab (HUMIRA *CF*) 80 MG/0.8ML pen kit     adalimumab (HUMIRA PEN) 40 MG/0.8ML pen kit     ketoconazole (NIZORAL) 2 % cream     naproxen (NAPROSYN) 500 MG tablet     fluocinonide (LIDEX) 0.05 % ointment     triamcinolone (KENALOG) 0.1 % ointment     vitamin D (ERGOCALCIFEROL) 35070 UNIT capsule     No current facility-administered medications for this visit.           Allergies   Allergen Reactions     Bees Swelling     Nkda [No Known Drug Allergies]            Impression and Recommendations (Patient Counseled on the Following):  1. Psoriasis with psoriatic arthritis, significantly improved after resuming Humira 6 weeks ago. We will continue plan today without changes. Labs are up to date.  - Continue Humira  - Labs 9/29/2020: Quant gold negative, CBC/hepatic panel wnl.  - Has h/o treated hepatitis C with negative HCV RNA in 2016    Follow-up:   Follow-up with dermatology in approximately 6 months. Earlier for new or changing lesions or rash.      Staff only    Michaelle Diana MD    Department of Dermatology  [facility]: Phone: [phone], Fax:  [phone]  11/12/2020  _____________________________________________________________________________    Teledermatology information:  - Location of patient: [place]  - Patient presented as: return  - Location of teledermatologist:  [facility] [place] )  - Reason teledermatology is appropriate:  of National Emergency Regarding Coronavirus disease (COVID 19) Outbreak  - Image quality and interpretability: acceptable  - Physician has received verbal consent for a Video/Photos Visit from the patient? YES  - In-person dermatology visit recommendation: no  - Date of images: 11/10/2020  - Service start time: 11:47 AM  - Service end time: 11:52 AM  - Date of report: 11/10/2020

## 2020-11-10 NOTE — NURSING NOTE
Dermatology Rooming Note    Minh Pereira's goals for this visit include:   Chief Complaint   Patient presents with     Derm Problem     Plaque psoriasis and psoriatic arthritis - Minh states he has been doing great      Clary Garcia, CMA

## 2020-11-10 NOTE — LETTER
11/10/2020       RE: Minh Pereira  [address] [address]     Dear Colleague,    Thank you for referring your patient, Minh Pereira, to the [facility] [place] at [facility]. Please see a copy of my visit note below.    [facility] Dermatology Record:  Store and Forward and Telephone see chart      Dermatology Problem List:  1. Plaque psoriasis and psoriatic arthritis: Many years of disease.   - Has done well on Humira, initially started ~2016, resumed 9/2020  - s/p methotrexate, failed Enbrel/flared on enbrel  - Pertinent PMH of hep C s/p treatment; cleared HCV RNA in 2016  - Topicals: triamcinolone ointment, desonide ointment  2. Onychomycosis. culture 08/11/2016, s/p terbinafine 250 mg daily 2017.    Encounter Date: Nov 10, 2020    CC:   Chief Complaint   Patient presents with     Derm Problem     Plaque psoriasis and psoriatic arthritis - Minh states he has been doing great        History of Present Illness:  Minh Pereira is a 35 year old male who presents for psoriasis and psoriatic arthritis.    We saw him in September 2020 at which plan was to resume Humira.  6 weeks ago he started Humira. No issues.  Skin much better, joints/muscles improved too.  Mostly it's clear, still a lot spots here and there.  Forehead and face still have some areas, other problems areas.    No infections, no injection site reactions.    [place]    ROS: Patient is generally feeling well today.    Physical Examination:  General: Mood pleasant, answers questions appropriately, interactive.   Skin: Focused examination including back, chest, abdomen, and legs was performed.   -examined skin fairly clear - some hyperpigmented patches on legs.             Labs:  Reviewed - 9/29/2020.  Quant gold negative.  CBC, hepatic panel wnl.  Hep B surface ab positive, hep b surface antigen and hep b core antibody  negative.    Past Medical History:   Patient Active Problem List   Diagnosis     Psoriatic arthropathy (H)     Clavus     Backache     Hyperkeratosis of palms and soles     Psoriasis     Encounter for long-term (current) use of high-risk medication     Past Medical History:   Diagnosis Date     Arthritis     psoriatic arthritis     Hepatitis C      History of hepatitis C     SVR 12/16/16     Psoriasis      Psoriasis      Psoriatic arthritis (H)      No past surgical history on file.    Social History:  Patient reports that he has been smoking cigarettes. He has a 4.00 pack-year smoking history. He has never used smokeless tobacco. He reports current alcohol use. He reports current drug use. Drug: Marijuana.    Family History:  Family History   Problem Relation Age of Onset     Arthritis Mother         OA, no FHx of PsA or psoriasis     Cancer No family hx of         No family history of skin cancer     Melanoma No family hx of      Skin Cancer No family hx of        Medications:  Current Outpatient Medications   Medication     adalimumab (HUMIRA *CF*) 40 MG/0.4ML pen kit     adalimumab (HUMIRA *CF*) 80 MG/0.8ML pen kit     adalimumab (HUMIRA PEN) 40 MG/0.8ML pen kit     ketoconazole (NIZORAL) 2 % cream     naproxen (NAPROSYN) 500 MG tablet     fluocinonide (LIDEX) 0.05 % ointment     triamcinolone (KENALOG) 0.1 % ointment     vitamin D (ERGOCALCIFEROL) 97750 UNIT capsule     No current facility-administered medications for this visit.           Allergies   Allergen Reactions     Bees Swelling     Nkda [No Known Drug Allergies]            Impression and Recommendations (Patient Counseled on the Following):  1. Psoriasis with psoriatic arthritis, significantly improved after resuming Humira 6 weeks ago. We will continue plan today without changes. Labs are up to date.  - Continue Humira  - Labs 9/29/2020: Quant gold negative, CBC/hepatic panel wnl.  - Has h/o treated hepatitis C with negative HCV RNA in  2016    Follow-up:   Follow-up with dermatology in approximately 6 months. Earlier for new or changing lesions or rash.      Staff only    Michaelle Diana MD    Department of Dermatology  [facility]: Phone: [phone], Fax: [phone]  11/12/2020  _____________________________________________________________________________    Teledermatology information:  - Location of patient: [place]  - Patient presented as: return  - Location of teledermatologist:  ([facility] [place] )  - Reason teledermatology is appropriate:  of National Emergency Regarding Coronavirus disease (COVID 19) Outbreak  - Image quality and interpretability: acceptable  - Physician has received verbal consent for a Video/Photos Visit from the patient? YES  - In-person dermatology visit recommendation: no  - Date of images: 11/10/2020  - Service start time: 11:47 AM  - Service end time: 11:52 AM  - Date of report: 11/10/2020

## 2020-11-12 NOTE — PATIENT INSTRUCTIONS
[facility] Dermatology Visit    Thank you for allowing us to participate in your care. Your findings, instructions and follow-up plan are as follows:    For your psoriasis:  - I'm glad you're doing so well.  - Continue the Humira.  - Let us know if any issues!    Return in 6 months, sooner if concerns.       When should I call my doctor?    If you are worsening or not improving, please, contact us or seek urgent care as noted below.     Who should I call with questions (adults)?    [facility] (adult and pediatric): [phone]     [facility] (adult): [phone]    For urgent needs outside of business hours call the [facility] [[email or web address]] at [phone] and ask for the dermatology resident on call    If this is a medical emergency and you are unable to reach an ER, Call [phone]      Who should I call with questions (pediatric)?  [facility]- Pediatric Dermatology  Dr. Pily Rodriguez, Dr. Patrick Mcdonald, Dr. Michelle Donis, Zakiya Caal, BHARTI Price, Dr. Rupal Richard & Dr. Jai Acosta  Non Urgent  Nurse Triage Line; [phone]- Brittney and Michela RN Care Coordinators   Mireya (/Complex ) [phone]    If you need a prescription refill, please contact your pharmacy. Refills are approved or denied by our Physicians during normal business hours, Monday through Fridays  Per office policy, refills will not be granted if you have not been seen within the past year (or sooner depending on your child's condition)    Scheduling Information:  Pediatric Appointment Scheduling and Call Center [phone]  Radiology Scheduling- [phone]  Sedation Unit Scheduling- [phone]  [place] Scheduling- General [phone]; Pediatric Dermatology [phone]  Main  Services: [phone]  Georgian: [phone]  Luxembourger: [phone]  Hmong/Sami/Prydeinig:  [phone]  Preadmission Nursing Department Fax Number: [phone] (Fax all pre-operative paperwork to this number)    For urgent matters arising during evenings, weekends, or holidays that cannot wait for normal business hours please call [phone] and ask for the Dermatology Resident On-Call to be paged.

## 2021-01-14 ENCOUNTER — HEALTH MAINTENANCE LETTER (OUTPATIENT)
Age: 36
End: 2021-01-14

## 2021-05-04 ENCOUNTER — OFFICE VISIT (OUTPATIENT)
Dept: DERMATOLOGY | Facility: CLINIC | Age: 36
End: 2021-05-04
Payer: COMMERCIAL

## 2021-05-04 DIAGNOSIS — L40.9 PSORIASIS: Primary | ICD-10-CM

## 2021-05-04 DIAGNOSIS — L40.50 PSORIATIC ARTHRITIS (H): ICD-10-CM

## 2021-05-04 DIAGNOSIS — L84 CALLUS: ICD-10-CM

## 2021-05-04 DIAGNOSIS — Z51.81 ENCOUNTER FOR THERAPEUTIC DRUG MONITORING: ICD-10-CM

## 2021-05-04 PROCEDURE — 99214 OFFICE O/P EST MOD 30 MIN: CPT | Mod: GC | Performed by: DERMATOLOGY

## 2021-05-04 RX ORDER — UREA 8.5 G/85G
CREAM TOPICAL DAILY
Qty: 453 G | Refills: 1 | Status: SHIPPED | OUTPATIENT
Start: 2021-05-04 | End: 2023-09-21

## 2021-05-04 ASSESSMENT — PAIN SCALES - GENERAL: PAINLEVEL: NO PAIN (0)

## 2021-05-04 NOTE — PATIENT INSTRUCTIONS
1. Glad things are going well!  2. Try using the Urea cream again on the hands (10-20%) to see if this helps the pitting.   3. Keep using the Humira and we will check your annual labs at the next visit.   4. Follow up in 6 months.

## 2021-05-04 NOTE — PROGRESS NOTES
[facility] Dermatology Note  Encounter Date: May 4, 2021  Office Visit     Dermatology Problem List:  1. Plaque psoriasis and psoriatic arthritis: Many years of disease.   - Has done well on Humira, initially started ~2016, resumed 9/2020  - s/p methotrexate, failed Enbrel/flared on enbrel  - Pertinent PMH of hep C s/p treatment; cleared HCV RNA in 2016  - Topicals: triamcinolone ointment, desonide ointment  2. Onychomycosis. culture 08/11/2016, s/p terbinafine 250 mg daily 2017.  3. Calluses versus manifestation of psoriasis v PPK, palms.  - Urea 20% cream  ____________________________________________    Assessment & Plan:     # Psoriasis with psoriatic arthritis, doing well on Humira. Labs up to date. Continue Humira and topicals prn, no changes to plan today.  - Labs 9/29/2020: Quant gold negative, CBC/hepatic panel wnl.  - Has h/o treated hepatitis C with negative HCV RNA in 2016  - Continue Humira  - Continue TMC, desonide ointment BID prn  - Repeat quant gold at next visit, ordered today    # Hyperkeratotic lesions with pitting on hands.  Ddx calluses versus manifestation of psoriasis versus PPK. No improvement with topical steroids (although may not be potent enough). Will trial urea cream.  - Start urea 20% cream daily prn    Procedures Performed:   None    Follow-up: 6 month(s) in-person, or earlier for new or changing lesions    Staff and Resident:     Gene Nagel MD  [facility] Resident  Pager# [phone]    Precepted with Dr. Diana    Staff Physician Comments:   I saw and evaluated the patient with the resident and I agree with the assessment and plan.  I was present for the examination.    Michaelle Diana MD    Department of Dermatology  [facility]: Phone: [phone], Fax: [phone]  5/6/2021     ____________________________________________    CC: Derm Problem  (desean is coming in today for a follow up on psoriasis, states that things are going great, skin has been clear)    HPI:  Mr. Desean Pereira is a(n) 35 year old male who presents today for follow-up  for psoriasis    Restarted on Humira this past fall. Joint pain is much improved. No recent infections. No new rashes or lesions. Rash on the trunk has cleared. Occasionally he will skip a dose and he notices a couple of spots around his ankles that will flare up.     Has noticed pitting on his palms. States this has been a problem for 5 years. Will flare with his psoriasis. Feels like this has been worsening over the last few years since his job working at the [employer] where he has developed significant calluses.      He is switching jobs from the [employer] to [employer].    Patient is otherwise feeling well, without additional skin concerns.    Labs Reviewed:  Component      Latest Ref Rng & Units 9/29/2020   WBC      4.0 - 11.0 10e9/L 6.3   RBC Count      4.4 - 5.9 10e12/L 4.97   Hemoglobin      13.3 - 17.7 g/dL 15.4   Hematocrit      40.0 - 53.0 % 45.3   MCV      78 - 100 fl 91   MCH      26.5 - 33.0 pg 31.0   MCHC      31.5 - 36.5 g/dL 34.0   RDW      10.0 - 15.0 % 14.4   Platelet Count      150 - 450 10e9/L 256   Diff Method       Automated Method   % Neutrophils      % 52.2   % Lymphocytes      % 35.2   % Monocytes      % 7.0   % Eosinophils      % 4.6   % Basophils      % 0.5   % Immature Granulocytes      % 0.5   Nucleated RBCs      0 /100 0   Absolute Neutrophil      1.6 - 8.3 10e9/L 3.3   Absolute Lymphocytes      0.8 - 5.3 10e9/L 2.2   Absolute Monocytes      0.0 - 1.3 10e9/L 0.4   Absolute Eosinophils      0.0 - 0.7 10e9/L 0.3   Absolute Basophils      0.0 - 0.2 10e9/L 0.0   Abs Immature Granulocytes      0 - 0.4 10e9/L 0.0   Absolute Nucleated RBC       0.0   Bilirubin Direct      0.0 - 0.2 mg/dL 0.1   Bilirubin Total      0.2 - 1.3 mg/dL 0.3   Albumin      3.4 - 5.0 g/dL 3.6   Protein Total      6.8 - 8.8  g/dL 7.2   Alkaline Phosphatase      40 - 150 U/L 76   ALT      0 - 70 U/L 20   AST      0 - 45 U/L 12   MTB Quantiferon Result      NEG:Negative Negative   TB1 Ag minus Nil      IU/mL 0.00   TB2 Ag minus Nil      IU/mL 0.00   Mitogen minus Nil      IU/mL 9.92   Nil Result      IU/mL 0.08   HIV Antigen Antibody Combo      NR:Nonreactive     Nonreactive   Hepatitis B Core Myah      NR:Nonreactive Nonreactive   Hep B Surface Agn      NR:Nonreactive Nonreactive   Hepatitis B Surface Antibody      <8.00 m[IU]/mL >1,000.00 (H)       Physical Exam:  Vitals: There were no vitals taken for this visit.  SKIN: Focused examination of hands, ankles, face, neck was performed.  - Skin fairly clear, hyperkeratotic yellowish papules on palms with pitting  - No other lesions of concern on areas examined.     Medications:  Current Outpatient Medications   Medication     adalimumab (HUMIRA *CF*) 40 MG/0.4ML pen kit     adalimumab (HUMIRA *CF*) 80 MG/0.8ML pen kit     adalimumab (HUMIRA PEN) 40 MG/0.8ML pen kit     fluocinonide (LIDEX) 0.05 % ointment     ketoconazole (NIZORAL) 2 % cream     naproxen (NAPROSYN) 500 MG tablet     triamcinolone (KENALOG) 0.1 % ointment     vitamin D (ERGOCALCIFEROL) 11525 UNIT capsule     No current facility-administered medications for this visit.       Past Medical History:   Patient Active Problem List   Diagnosis     Psoriatic arthropathy (H)     Clavus     Backache     Hyperkeratosis of palms and soles     Psoriasis     Encounter for long-term (current) use of high-risk medication     Past Medical History:   Diagnosis Date     Arthritis     psoriatic arthritis     Hepatitis C      History of hepatitis C     SVR 12/16/16     Psoriasis      Psoriasis      Psoriatic arthritis (H)        [facility]  No address on file on close of this encounter.

## 2021-05-04 NOTE — NURSING NOTE
Dermatology Rooming Note    Desean Pereira's goals for this visit include:   Chief Complaint   Patient presents with     Derm Problem     desean is coming in today for a follow up on psoriasis, states that things are going great, skin has been clear     Gabbie Rene CMA on 5/4/2021 at 10:39 AM

## 2021-05-04 NOTE — LETTER
5/4/2021       RE: Minh Pereira  [address] [facility] [address]     Dear Colleague,    Thank you for referring your patient, Minh Pereira, to the [facility] DERMATOLOGY CLINIC [place] at [facility]. Please see a copy of my visit note below.    [facility] Dermatology Note  Encounter Date: May 4, 2021  Office Visit     Dermatology Problem List:  1. Plaque psoriasis and psoriatic arthritis: Many years of disease.   - Has done well on Humira, initially started ~2016, resumed 9/2020  - s/p methotrexate, failed Enbrel/flared on enbrel  - Pertinent PMH of hep C s/p treatment; cleared HCV RNA in 2016  - Topicals: triamcinolone ointment, desonide ointment  2. Onychomycosis. culture 08/11/2016, s/p terbinafine 250 mg daily 2017.  3. Calluses versus manifestation of psoriasis v PPK, palms.  - Urea 20% cream  ____________________________________________    Assessment & Plan:     # Psoriasis with psoriatic arthritis, doing well on Humira. Labs up to date. Continue Humira and topicals prn, no changes to plan today.  - Labs 9/29/2020: Quant gold negative, CBC/hepatic panel wnl.  - Has h/o treated hepatitis C with negative HCV RNA in 2016  - Continue Humira  - Continue TMC, desonide ointment BID prn  - Repeat quant gold at next visit, ordered today    # Hyperkeratotic lesions with pitting on hands.  Ddx calluses versus manifestation of psoriasis versus PPK. No improvement with topical steroids (although may not be potent enough). Will trial urea cream.  - Start urea 20% cream daily prn    Procedures Performed:   None    Follow-up: 6 month(s) in-person, or earlier for new or changing lesions    Staff and Resident:     Gene Nagel MD  [facility] Medicine Resident  Pager# [phone]    Precepted with Dr. Diana    Staff Physician Comments:   I saw and evaluated the patient with the resident and I agree with the assessment  and plan.  I was present for the examination.    Michaelle Diana MD    Department of Dermatology  [facility]: Phone: [phone], Fax: [phone]  5/6/2021     ____________________________________________    CC: Derm Problem (desean is coming in today for a follow up on psoriasis, states that things are going great, skin has been clear)    HPI:  Mr. Desean Pereira is a(n) 35 year old male who presents today for follow-up  for psoriasis    Restarted on Humira this past fall. Joint pain is much improved. No recent infections. No new rashes or lesions. Rash on the trunk has cleared. Occasionally he will skip a dose and he notices a couple of spots around his ankles that will flare up.     Has noticed pitting on his palms. States this has been a problem for 5 years. Will flare with his psoriasis. Feels like this has been worsening over the last few years [place] significant calluses.      He is switching jobs from the [employer] to [employer].    Patient is otherwise feeling well, without additional skin concerns.    Labs Reviewed:  Component      Latest Ref Rng & Units 9/29/2020   WBC      4.0 - 11.0 10e9/L 6.3   RBC Count      4.4 - 5.9 10e12/L 4.97   Hemoglobin      13.3 - 17.7 g/dL 15.4   Hematocrit      40.0 - 53.0 % 45.3   MCV      78 - 100 fl 91   MCH      26.5 - 33.0 pg 31.0   MCHC      31.5 - 36.5 g/dL 34.0   RDW      10.0 - 15.0 % 14.4   Platelet Count      150 - 450 10e9/L 256   Diff Method       Automated Method   % Neutrophils      % 52.2   % Lymphocytes      % 35.2   % Monocytes      % 7.0   % Eosinophils      % 4.6   % Basophils      % 0.5   % Immature Granulocytes      % 0.5   Nucleated RBCs      0 /100 0   Absolute Neutrophil      1.6 - 8.3 10e9/L 3.3   Absolute Lymphocytes      0.8 - 5.3 10e9/L 2.2   Absolute Monocytes      0.0 - 1.3 10e9/L 0.4   Absolute  Eosinophils      0.0 - 0.7 10e9/L 0.3   Absolute Basophils      0.0 - 0.2 10e9/L 0.0   Abs Immature Granulocytes      0 - 0.4 10e9/L 0.0   Absolute Nucleated RBC       0.0   Bilirubin Direct      0.0 - 0.2 mg/dL 0.1   Bilirubin Total      0.2 - 1.3 mg/dL 0.3   Albumin      3.4 - 5.0 g/dL 3.6   Protein Total      6.8 - 8.8 g/dL 7.2   Alkaline Phosphatase      40 - 150 U/L 76   ALT      0 - 70 U/L 20   AST      0 - 45 U/L 12   MTB Quantiferon Result      NEG:Negative Negative   TB1 Ag minus Nil      IU/mL 0.00   TB2 Ag minus Nil      IU/mL 0.00   Mitogen minus Nil      IU/mL 9.92   Nil Result      IU/mL 0.08   HIV Antigen Antibody Combo      NR:Nonreactive     Nonreactive   Hepatitis B Core Myah      NR:Nonreactive Nonreactive   Hep B Surface Agn      NR:Nonreactive Nonreactive   Hepatitis B Surface Antibody      <8.00 m[IU]/mL >1,000.00 (H)       Physical Exam:  Vitals: There were no vitals taken for this visit.  SKIN: Focused examination of hands, ankles, face, neck was performed.  - Skin fairly clear, hyperkeratotic yellowish papules on palms with pitting  - No other lesions of concern on areas examined.     Medications:  Current Outpatient Medications   Medication     adalimumab (HUMIRA *CF*) 40 MG/0.4ML pen kit     adalimumab (HUMIRA *CF*) 80 MG/0.8ML pen kit     adalimumab (HUMIRA PEN) 40 MG/0.8ML pen kit     fluocinonide (LIDEX) 0.05 % ointment     ketoconazole (NIZORAL) 2 % cream     naproxen (NAPROSYN) 500 MG tablet     triamcinolone (KENALOG) 0.1 % ointment     vitamin D (ERGOCALCIFEROL) 35980 UNIT capsule     No current facility-administered medications for this visit.       Past Medical History:   Patient Active Problem List   Diagnosis     Psoriatic arthropathy (H)     Clavus     Backache     Hyperkeratosis of palms and soles     Psoriasis     Encounter for long-term (current) use of high-risk medication     Past Medical History:   Diagnosis Date     Arthritis     psoriatic arthritis     Hepatitis C       History of hepatitis C     SVR 12/16/16     Psoriasis      Psoriasis      Psoriatic arthritis (H)        CC [facility]  No address on file on close of this encounter.

## 2021-10-19 ENCOUNTER — TELEPHONE (OUTPATIENT)
Dept: DERMATOLOGY | Facility: CLINIC | Age: 36
End: 2021-10-19

## 2021-10-19 DIAGNOSIS — Z91.199 FAILURE TO ATTEND APPOINTMENT: Primary | ICD-10-CM

## 2021-10-19 NOTE — TELEPHONE ENCOUNTER
[facility] Call Center    Phone Message    May a detailed message be left on voicemail: yes     Reason for Call: PA was approved and will be approved from 10/20/21 until 10/18/22.   Thank you    Action Taken: Message routed to:  [facility] Derm    Travel Screening: Not Applicable

## 2021-10-19 NOTE — TELEPHONE ENCOUNTER
PA Initiation    Medication: Humira  Insurance Company: [id number] - Phone [phone] Fax [phone]  ID#: [id number]  Pharmacy Filling the Rx: [facility] - [place], [place] - [address] [place] [address]  Filling Pharmacy Phone:    Filling Pharmacy Fax:    Start Date: 10/19/2021    CMM Key [id number]

## 2021-10-20 NOTE — TELEPHONE ENCOUNTER
Prior Authorization Approval    Authorization Effective Date: 10/20/2021  Authorization Expiration Date: 10/18/2022  Medication: Humira  Approved Dose/Quantity:   Reference #: CMM Key [id number]   Insurance Company: [employer] - Phone [phone] Fax [phone]  Expected CoPay:       CoPay Card Available:      Foundation Assistance Needed:    Which Pharmacy is filling the prescription (Not needed for infusion/clinic administered): [facility] - [place], [place] - [address] [place] [address]  Pharmacy Notified: Yes  Patient Notified: Yes

## 2021-10-23 ENCOUNTER — HEALTH MAINTENANCE LETTER (OUTPATIENT)
Age: 36
End: 2021-10-23

## 2022-02-12 ENCOUNTER — HEALTH MAINTENANCE LETTER (OUTPATIENT)
Age: 37
End: 2022-02-12

## 2022-03-21 DIAGNOSIS — L40.9 PSORIASIS: ICD-10-CM

## 2022-03-21 DIAGNOSIS — L40.50 PSORIATIC ARTHRITIS (H): ICD-10-CM

## 2022-03-21 NOTE — TELEPHONE ENCOUNTER
[facility] Call Center    Phone Message    May a detailed message be left on voicemail: yes     Reason for Call: Medication Refill Request    Has the patient contacted the pharmacy for the refill? Yes   Name of medication being requested: adalimumab (HUMIRA *CF*) 40 MG/0.4ML pen kit   Provider who prescribed the medication: Lebron  Pharmacy: [facility] - [place], [place] - [address] [facility]  Date medication is needed: ASAP         Action Taken: Message routed to:  [facility] Derm    Travel Screening: Not Applicable

## 2022-03-21 NOTE — TELEPHONE ENCOUNTER
"    adalimumab (HUMIRA *CF*) 40 MG/0.4ML pen kit  Last Written Prescription Date:  5/4/21  Last Fill Quantity: 0.8ml,   # refills: 5  Last Office Visit : 5/4/21  Future Office visit: none    \" Follow-up: 6 month(s) in-person\"    Scheduling has been notified to contact the pt for appointment.        Routing refill request to provider for review/approval because: not on protocol . Past due for appt.  "

## 2022-03-28 DIAGNOSIS — L40.9 PSORIASIS: ICD-10-CM

## 2022-03-28 DIAGNOSIS — L40.50 PSORIATIC ARTHRITIS (H): ICD-10-CM

## 2022-03-30 DIAGNOSIS — L40.9 PSORIASIS: ICD-10-CM

## 2022-03-30 DIAGNOSIS — L40.50 PSORIATIC ARTHRITIS (H): ICD-10-CM

## 2022-03-30 RX ORDER — ADALIMUMAB 40MG/0.4ML
KIT SUBCUTANEOUS
Refills: 5 | OUTPATIENT
Start: 2022-03-30

## 2022-03-30 NOTE — TELEPHONE ENCOUNTER
Recieved refill request for Humira Pen 40 MG/0.4ML Subcutaneous Pen-injector Kit (adalimumab) . Patient needs appointment scheduled prior to any refills. Clinic Coordinator notified and will follow up with the patient as appropriate. The pharmacy has been notified that the medication will not be refilled prior to an appointment being scheduled.

## 2022-03-31 ENCOUNTER — TELEPHONE (OUTPATIENT)
Dept: DERMATOLOGY | Facility: CLINIC | Age: 37
End: 2022-03-31
Payer: COMMERCIAL

## 2022-03-31 NOTE — TELEPHONE ENCOUNTER
Prior Authorization Approval    Authorization Effective Date: 10/18/2021  Authorization Expiration Date: 10/18/2022  Medication: Humira - Approved  Approved Dose/Quantity: 40mg/2 per 28 days  Reference #: CMM Key: [id number]   Insurance Company: [employer] - Phone [phone] Fax [phone]  Expected CoPay:       CoPay Card Available:      Foundation Assistance Needed:    Which Pharmacy is filling the prescription (Not needed for infusion/clinic administered): [facility] - [place], [place] - [address] [place] [address]  Pharmacy Notified:  Yes  Patient Notified:  Yes    Per call from [facility] this PA is approved.  Patient must fill with [facility].

## 2022-03-31 NOTE — TELEPHONE ENCOUNTER
Patient made a follow up appt 6/29/22. Per discussion with Dr. Diana, refilled Humira to bridge to this appt.    CC'ing Dr. iDana as JORJE Vasquez MD

## 2022-03-31 NOTE — PROGRESS NOTES
Patient made a follow up appt 6/29/22. Per discussion with Dr. Diana, refilled Humira to bridge to this appt.    CC'ing Dr. Diana as JORJE Vasquez MD

## 2022-03-31 NOTE — TELEPHONE ENCOUNTER
PA Initiation    Medication: Humira - Pending  Insurance Company: [employer] - Phone [phone] Fax [phone]  Pharmacy Filling the Rx: [facility] - [place], [place] - [address] [place] [address]  Filling Pharmacy Phone:    Filling Pharmacy Fax:    Start Date: 3/31/2022

## 2022-05-24 ENCOUNTER — LAB (OUTPATIENT)
Dept: LAB | Facility: CLINIC | Age: 37
End: 2022-05-24
Payer: COMMERCIAL

## 2022-05-24 ENCOUNTER — OFFICE VISIT (OUTPATIENT)
Dept: DERMATOLOGY | Facility: CLINIC | Age: 37
End: 2022-05-24
Payer: COMMERCIAL

## 2022-05-24 DIAGNOSIS — L40.50 PSORIATIC ARTHRITIS (H): ICD-10-CM

## 2022-05-24 DIAGNOSIS — L40.9 PSORIASIS: ICD-10-CM

## 2022-05-24 DIAGNOSIS — Z00.00 PREVENTATIVE HEALTH CARE: Primary | ICD-10-CM

## 2022-05-24 PROCEDURE — 99000 SPECIMEN HANDLING OFFICE-LAB: CPT | Performed by: PATHOLOGY

## 2022-05-24 PROCEDURE — 87522 HEPATITIS C REVRS TRNSCRPJ: CPT | Mod: 90 | Performed by: PATHOLOGY

## 2022-05-24 PROCEDURE — 99213 OFFICE O/P EST LOW 20 MIN: CPT | Performed by: DERMATOLOGY

## 2022-05-24 PROCEDURE — 36415 COLL VENOUS BLD VENIPUNCTURE: CPT | Performed by: PATHOLOGY

## 2022-05-24 PROCEDURE — 86481 TB AG RESPONSE T-CELL SUSP: CPT | Mod: 90 | Performed by: PATHOLOGY

## 2022-05-24 ASSESSMENT — PAIN SCALES - GENERAL: PAINLEVEL: NO PAIN (0)

## 2022-05-24 NOTE — PROGRESS NOTES
[facility] Dermatology Note  Encounter Date: May 24, 2022  Office Visit     Dermatology Problem List:  1. Plaque psoriasis and psoriatic arthritis: Many years of disease.   - Has done well on Humira, initially started ~2016, resumed 9/2020  - s/p methotrexate, failed Enbrel/flared on enbrel  - Pertinent PMH of hep C s/p treatment; cleared HCV RNA in 2016  - s/p triamcinolone ointment, desonide ointment  2. Onychomycosis. culture 08/11/2016, s/p terbinafine 250 mg daily 2017.  3. Calluses versus manifestation of psoriasis v PPK, palms.  - Urea 20% cream  ____________________________________________    Assessment & Plan:    # Psoriasis with psoriatic arthritis. Chronic, stable.   Doing well on Humira with well-controlled joint symptoms and no significant active plaques on exam today.  Will recheck screening labs including quant gold and Hep C PCR.   - Labs reviewed: quant gold from 9/29/2020  - Labs today: Quant gold and Hepatitis C RNA   - Continue Humira pending labs as above.     Procedures Performed:   None    Follow-up: 1 year(s) in-person, or earlier for new or changing lesions    Staff and Scribe:     Scribe Disclosure:  I, Doris Lam, am serving as a scribe to document services personally performed by Trevon Deluna MD based on data collection and the provider's statements to me.     Provider Disclosure:   The documentation recorded by the scribe accurately reflects the services I personally performed and the decisions made by me.    Trevon Deluna MD    Department of Dermatology  [facility]: Phone: [phone], Fax:[phone]  [facility]: Phone: [phone] Fax: [phone]  ____________________________________________    CC: Psoriasis (Minh is here for a follow-up on his psoriasis. States there has been no changes since he was last seen and he just  needs a refill.)      HPI:  Mr. Minh Pereira is a(n) 36 year old male who presents today as a return patient for psoriasis follow-up. Last seen by Dr. Diana on 5/4/21, at which time the patient was continued on Humira for psoriasis. Patient was also started on urea 20% cream PRN for Hhyperkeratotic lesions with pitting on hands.    Today, the patient's psoriasis is stable while on Humira. There are a few small spots on his knees. Not currently using the topical steroids. Also notes some arthralgias, but has had improvement with this while on the Humira,    Patient is otherwise feeling well, without additional skin concerns.    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    Labs Reviewed:  N/A    Physical Exam:  Vitals: There were no vitals taken for this visit.  SKIN: Focused examination of the lower extremities was performed.  - Skin relatively clear no joint deformitie.   - No other lesions of concern on areas examined.     Medications:  Current Outpatient Medications   Medication     adalimumab (HUMIRA *CF*) 40 MG/0.4ML pen kit     adalimumab (HUMIRA *CF*) 80 MG/0.8ML pen kit     adalimumab (HUMIRA PEN) 40 MG/0.8ML pen kit     fluocinonide (LIDEX) 0.05 % ointment     ketoconazole (NIZORAL) 2 % cream     naproxen (NAPROSYN) 500 MG tablet     triamcinolone (KENALOG) 0.1 % ointment     urea (CARMOL) 10 % external cream     urea (GORMEL) 20 % external cream     vitamin D (ERGOCALCIFEROL) 93217 UNIT capsule     No current facility-administered medications for this visit.        Past Medical History:   Patient Active Problem List   Diagnosis     Psoriatic arthropathy (H)     Clavus     Backache     Hyperkeratosis of palms and soles     Psoriasis     Encounter for long-term (current) use of high-risk medication     Past Medical History:   Diagnosis Date     Arthritis     psoriatic arthritis     Hepatitis C      History of hepatitis C     SVR 12/16/16     Psoriasis      Psoriasis      Psoriatic arthritis (H)

## 2022-05-24 NOTE — LETTER
Date:May 26, 2022      Patient was self referred, no letter generated. Do not send.        [facility] Health Information

## 2022-05-24 NOTE — NURSING NOTE
Dermatology Rooming Note    Minh Pereira's goals for this visit include:   Chief Complaint   Patient presents with     Psoriasis     Minh is here for a follow-up on his psoriasis. States there has been no changes since he was last seen and he just needs a refill.     Scottie Graham, EMT

## 2022-05-24 NOTE — LETTER
5/24/2022       RE: Minh Pereira  [address] [facility] [address]     Dear Colleague,    Thank you for referring your patient, Minh Pereira, to the [facility] DERMATOLOGY CLINIC [place] [facility]. Please see a copy of my visit note below.    [facility] Dermatology Note  Encounter Date: May 24, 2022  Office Visit     Dermatology Problem List:  1. Plaque psoriasis and psoriatic arthritis: Many years of disease.   - Has done well on Humira, initially started ~2016, resumed 9/2020  - s/p methotrexate, failed Enbrel/flared on enbrel  - Pertinent PMH of hep C s/p treatment; cleared HCV RNA in 2016  - s/p triamcinolone ointment, desonide ointment  2. Onychomycosis. culture 08/11/2016, s/p terbinafine 250 mg daily 2017.  3. Calluses versus manifestation of psoriasis v PPK, palms.  - Urea 20% cream  ____________________________________________    Assessment & Plan:    # Psoriasis with psoriatic arthritis. Chronic, stable.   Doing well on Humira with well-controlled joint symptoms and no significant active plaques on exam today.  Will recheck screening labs including quant gold and Hep C PCR.   - Labs reviewed: quant gold from 9/29/2020  - Labs today: Quant gold and Hepatitis C RNA   - Continue Humira pending labs as above.     Procedures Performed:   None    Follow-up: 1 year(s) in-person, or earlier for new or changing lesions    Staff and Scribe:     Scribe Disclosure:  I, Doris Lam, am serving as a scribe to document services personally performed by Trevon Deluna MD based on data collection and the provider's statements to me.     Provider Disclosure:   The documentation recorded by the scribe accurately reflects the services I personally performed and the decisions made by me.    Trevon Deluna MD    Department of Dermatology  [facility]  Clinics: Phone: [phone], Fax:[phone]  [facility]: Phone: [phone] Fax: [phone]  ____________________________________________    CC: Psoriasis (Minh is here for a follow-up on his psoriasis. States there has been no changes since he was last seen and he just needs a refill.)      HPI:  Mr. Minh Pereira is a(n) 36 year old male who presents today as a return patient for psoriasis follow-up. Last seen by Dr. Diana on 5/4/21, at which time the patient was continued on Humira for psoriasis. Patient was also started on urea 20% cream PRN for Hhyperkeratotic lesions with pitting on hands.    Today, the patient's psoriasis is stable while on Humira. There are a few small spots on his knees. Not currently using the topical steroids. Also notes some arthralgias, but has had improvement with this while on the Humira,    Patient is otherwise feeling well, without additional skin concerns.    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    Labs Reviewed:  N/A    Physical Exam:  Vitals: There were no vitals taken for this visit.  SKIN: Focused examination of the lower extremities was performed.  - Skin relatively clear no joint deformitie.   - No other lesions of concern on areas examined.     Medications:  Current Outpatient Medications   Medication     adalimumab (HUMIRA *CF*) 40 MG/0.4ML pen kit     adalimumab (HUMIRA *CF*) 80 MG/0.8ML pen kit     adalimumab (HUMIRA PEN) 40 MG/0.8ML pen kit     fluocinonide (LIDEX) 0.05 % ointment     ketoconazole (NIZORAL) 2 % cream     naproxen (NAPROSYN) 500 MG tablet     triamcinolone (KENALOG) 0.1 % ointment     urea (CARMOL) 10 % external cream     urea (GORMEL) 20 % external cream     vitamin D (ERGOCALCIFEROL) 81411 UNIT capsule     No current facility-administered medications for this visit.        Past Medical History:   Patient Active Problem List   Diagnosis     Psoriatic arthropathy (H)     Clavus      Backache     Hyperkeratosis of palms and soles     Psoriasis     Encounter for long-term (current) use of high-risk medication     Past Medical History:   Diagnosis Date     Arthritis     psoriatic arthritis     Hepatitis C      History of hepatitis C     SVR 12/16/16     Psoriasis      Psoriasis      Psoriatic arthritis (H)            Again, thank you for allowing me to participate in the care of your patient.      Sincerely,    Trevon Deluna MD

## 2022-05-25 LAB
HCV RNA SERPL NAA+PROBE-ACNC: NOT DETECTED IU/ML
QUANTIFERON MITOGEN: 10 IU/ML
QUANTIFERON NIL TUBE: 0.12 IU/ML
QUANTIFERON TB1 TUBE: 0.09 IU/ML
QUANTIFERON TB2 TUBE: 0.05

## 2022-05-26 LAB
GAMMA INTERFERON BACKGROUND BLD IA-ACNC: 0.12 IU/ML
M TB IFN-G BLD-IMP: NEGATIVE
M TB IFN-G CD4+ BCKGRND COR BLD-ACNC: 9.88 IU/ML
MITOGEN IGNF BCKGRD COR BLD-ACNC: -0.03 IU/ML
MITOGEN IGNF BCKGRD COR BLD-ACNC: -0.07 IU/ML

## 2022-05-27 DIAGNOSIS — L40.50 PSORIATIC ARTHRITIS (H): ICD-10-CM

## 2022-05-27 DIAGNOSIS — L40.9 PSORIASIS: ICD-10-CM

## 2022-09-30 ENCOUNTER — TELEPHONE (OUTPATIENT)
Dept: DERMATOLOGY | Facility: CLINIC | Age: 37
End: 2022-09-30

## 2022-09-30 NOTE — TELEPHONE ENCOUNTER
PA Initiation    Medication: Humira  Insurance Company: [employer] - Phone [phone] Fax [phone]  [facility] [place] [facility] [address]  Filling Pharmacy Phone:    Filling Pharmacy Fax:    Start Date: 9/30/2022    Key: [id number]

## 2022-10-03 NOTE — TELEPHONE ENCOUNTER
Prior Authorization Approval    Authorization Effective Date: 9/30/2022  Authorization Expiration Date: 9/29/2023  Medication: Humira  Approved Dose/Quantity: 2 for 28 days  Reference #: Key: [id number]   Insurance Company: [email or web address] - Phone [phone] Fax [phone]  Expected CoPay:       CoPay Card Available:      Foundation Assistance Needed:    Which Pharmacy is filling the prescription (Not needed for infusion/clinic administered[facility] [place] [place] [facility] [address]  Pharmacy Notified: No  Patient Notified: No

## 2022-10-10 ENCOUNTER — HEALTH MAINTENANCE LETTER (OUTPATIENT)
Age: 37
End: 2022-10-10

## 2022-10-20 DIAGNOSIS — L40.9 PSORIASIS: ICD-10-CM

## 2022-10-20 DIAGNOSIS — L40.50 PSORIATIC ARTHRITIS (H): ICD-10-CM

## 2022-10-25 RX ORDER — ADALIMUMAB 40MG/0.4ML
KIT SUBCUTANEOUS
Refills: 2 | OUTPATIENT
Start: 2022-10-25

## 2023-03-25 ENCOUNTER — HEALTH MAINTENANCE LETTER (OUTPATIENT)
Age: 38
End: 2023-03-25

## 2023-06-23 DIAGNOSIS — L40.50 PSORIATIC ARTHRITIS (H): ICD-10-CM

## 2023-06-23 DIAGNOSIS — L40.9 PSORIASIS: ICD-10-CM

## 2023-06-27 ENCOUNTER — TELEPHONE (OUTPATIENT)
Dept: DERMATOLOGY | Facility: CLINIC | Age: 38
End: 2023-06-27
Payer: COMMERCIAL

## 2023-06-27 DIAGNOSIS — Z51.81 THERAPEUTIC DRUG MONITORING: Primary | ICD-10-CM

## 2023-06-27 RX ORDER — ADALIMUMAB 40MG/0.4ML
40 KIT SUBCUTANEOUS
Qty: 0.8 ML | Refills: 0 | Status: SHIPPED | OUTPATIENT
Start: 2023-06-27 | End: 2023-06-29

## 2023-06-27 NOTE — TELEPHONE ENCOUNTER
1-month refill provided. Needs labs for additional refills. Ordered quant gold and Hep C PCR - can you schedule lab appointment?    Otherwise, also needs follow-up within the next 3 months with any provider.     Trevon Deluna MD  Pronouns: he/him/his    Department of Dermatology  [facility] Clinics: Phone: [phone], Fax:[phone]  [facility]: Phone: [phone] Fax: [phone]     Yes

## 2023-06-27 NOTE — TELEPHONE ENCOUNTER
adalimumab (HUMIRA *CF*) 40 MG/0.4ML pen kit   Last Written Prescription Date:  5/27/22  Last Fill Quantity: 0.8ml,   # refills: 11  Last Office Visit : 5/24/22  Future Office visit:  None    Scheduling has been notified to contact the pt for appointment.      Routing refill request to provider for review/approval because:  Not on protocol

## 2023-06-27 NOTE — TELEPHONE ENCOUNTER
Via phone patient was scheduled for the following:    Appointment type: Return  Provider:   Return date: 6-29-23  Specialty phone number: [phone]

## 2023-06-28 NOTE — TELEPHONE ENCOUNTER
Patient scheduled for clinic visit on 6/29/2023, with lab prior.     [employer] message sent to the patient informing them of lab appointment.

## 2023-06-29 ENCOUNTER — APPOINTMENT (OUTPATIENT)
Dept: LAB | Facility: CLINIC | Age: 38
End: 2023-06-29
Payer: COMMERCIAL

## 2023-06-29 ENCOUNTER — OFFICE VISIT (OUTPATIENT)
Dept: DERMATOLOGY | Facility: CLINIC | Age: 38
End: 2023-06-29
Payer: COMMERCIAL

## 2023-06-29 ENCOUNTER — LAB (OUTPATIENT)
Dept: LAB | Facility: CLINIC | Age: 38
End: 2023-06-29
Payer: COMMERCIAL

## 2023-06-29 DIAGNOSIS — L40.50 PSORIATIC ARTHRITIS (H): ICD-10-CM

## 2023-06-29 DIAGNOSIS — L85.2 KERATOSIS PUNCTATA: Primary | ICD-10-CM

## 2023-06-29 DIAGNOSIS — Z51.81 THERAPEUTIC DRUG MONITORING: ICD-10-CM

## 2023-06-29 DIAGNOSIS — L40.9 PSORIASIS: ICD-10-CM

## 2023-06-29 PROCEDURE — 87522 HEPATITIS C REVRS TRNSCRPJ: CPT

## 2023-06-29 PROCEDURE — 99000 SPECIMEN HANDLING OFFICE-LAB: CPT | Performed by: PATHOLOGY

## 2023-06-29 PROCEDURE — 86481 TB AG RESPONSE T-CELL SUSP: CPT | Performed by: DERMATOLOGY

## 2023-06-29 PROCEDURE — 99214 OFFICE O/P EST MOD 30 MIN: CPT | Mod: GC | Performed by: DERMATOLOGY

## 2023-06-29 PROCEDURE — 36415 COLL VENOUS BLD VENIPUNCTURE: CPT | Performed by: PATHOLOGY

## 2023-06-29 RX ORDER — TRETINOIN 1 MG/G
CREAM TOPICAL AT BEDTIME
Qty: 45 G | Refills: 11 | Status: SHIPPED | OUTPATIENT
Start: 2023-06-29

## 2023-06-29 RX ORDER — ADALIMUMAB 40MG/0.4ML
40 KIT SUBCUTANEOUS
Qty: 0.8 ML | Refills: 11 | Status: SHIPPED | OUTPATIENT
Start: 2023-06-29 | End: 2023-09-26

## 2023-06-29 ASSESSMENT — PAIN SCALES - GENERAL: PAINLEVEL: NO PAIN (0)

## 2023-06-29 NOTE — PROGRESS NOTES
[facility] Dermatology Note  Encounter Date: Jun 29, 2023  Office Visit     Dermatology Problem List:  1. Plaque psoriasis and psoriatic arthritis: Many years of disease.   - Has done well on Humira, initially started ~2016, resumed 9/2020  - s/p methotrexate, failed Enbrel/flared on enbrel  - Pertinent PMH of hep C s/p treatment; cleared HCV RNA in 2016  - s/p triamcinolone ointment, desonide ointment  2. Onychomycosis. culture 08/11/2016, s/p terbinafine 250 mg daily 2017.  3. Pitted palmar keratolysis  - Urea 20% cream  ____________________________________________      ____________________________________________    Assessment & Plan:   # Plaque psoriasis with psoriatic arthritis  Chronic, well controlled on Humira. Last quant gold and Hep C RNA negative in May 2022. Plan to repeat these labs today, which are already ordered by Dr. Deluna.   - repeat quant gold and Hep C quant PCR  - continue Humira pending above labs    # Pitted palmar keratolysis.   Feel that the patient's long-standing of hyperkeratotic, pitted plaques on the bilateral palms and to a lesser extent on the heels is best represented by pitted palmar keratolysis. Treatment options include keratolytics (urea appears to not be covered by insurance) and tretinoin. We will plan to start tretinoin today and consider OTC urea if not covered by insurance.    - start tretinoin 0.1% cream daily to palms  - consider OTC urea 40% cream if tretinoin not covered       Procedures Performed:   - none    Follow-up: 1 year(s) in-person, or earlier for new or changing lesions    Staff: Dr. Jina Palomino MD PGY-2  Dermatology Resident  Pager: [phone]    I, Veronica Muro MD, saw this patient with the resident and agree with the resident s findings and plan of care as documented in the resident s note.    ____________________________________________    CC: Derm Problem (Minh states his psoriasis has been great lately  )    HPI:  Mr. Minh Pereira is a(n) 37 year old male who presents today as a return patient for follow up of psoriasis on Humira. Last seen by Dr. Deluna on 5/24/23. Notes the following today:  -doing well on humira q2w  - no skin disease today  - joints are feeling well  - still having some scaling and pitting on his hands and to a lesser extent his feet. 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    Labs Reviewed:  Quant gold, Hep C RNA 2022 (both negative)    Physical Exam:  Vitals: There were no vitals taken for this visit.  SKIN: Focused examination of the face, abdomen, palms and soles was performed.  - on the palms especially at the MCP and PIP joints there are pits surrounded by hyperkeratotic scale  - some pitting and hyperkeratotic plaques present on the heels as well  - all skin examined is clear  - No other lesions of concern on areas examined.     Medications:  Current Outpatient Medications   Medication     adalimumab (HUMIRA *CF* PEN) 40 MG/0.4ML pen kit     adalimumab (HUMIRA *CF*) 80 MG/0.8ML pen kit     adalimumab (HUMIRA PEN) 40 MG/0.8ML pen kit     fluocinonide (LIDEX) 0.05 % ointment     ketoconazole (NIZORAL) 2 % cream     naproxen (NAPROSYN) 500 MG tablet     triamcinolone (KENALOG) 0.1 % ointment     urea (CARMOL) 10 % external cream     urea (GORMEL) 20 % external cream     vitamin D (ERGOCALCIFEROL) 85159 UNIT capsule     No current facility-administered medications for this visit.      Past Medical History:   Patient Active Problem List   Diagnosis     Psoriatic arthropathy (H)     Clavus     Backache     Hyperkeratosis of palms and soles     Psoriasis     Encounter for long-term (current) use of high-risk medication     Past Medical History:   Diagnosis Date     Arthritis     psoriatic  arthritis     Hepatitis C      History of hepatitis C     SVR 12/16/16     Psoriasis      Psoriasis      Psoriatic arthritis (H)        [facility]  No address on file on close of this encounter.

## 2023-06-29 NOTE — NURSING NOTE
Dermatology Rooming Note    Minh Pereira's goals for this visit include:   Chief Complaint   Patient presents with     Derm Problem     Minh states his psoriasis has been great lately      Clary TORRE CMA

## 2023-06-29 NOTE — LETTER
6/29/2023       RE: Minh Pereira  [address]  [facility] [facility] [address]     Dear Colleague,    Thank you for referring your patient, Minh Pereira, to the [facility] DERMATOLOGY CLINIC [place] at [facility]. Please see a copy of my visit note below.    [facility] Dermatology Note  Encounter Date: Jun 29, 2023  Office Visit     Dermatology Problem List:  1. Plaque psoriasis and psoriatic arthritis: Many years of disease.   - Has done well on Humira, initially started ~2016, resumed 9/2020  - s/p methotrexate, failed Enbrel/flared on enbrel  - Pertinent PMH of hep C s/p treatment; cleared HCV RNA in 2016  - s/p triamcinolone ointment, desonide ointment  2. Onychomycosis. culture 08/11/2016, s/p terbinafine 250 mg daily 2017.  3. Pitted palmar keratolysis  - Urea 20% cream  ____________________________________________      ____________________________________________    Assessment & Plan:   # Plaque psoriasis with psoriatic arthritis  Chronic, well controlled on Humira. Last quant gold and Hep C RNA negative in May 2022. Plan to repeat these labs today, which are already ordered by Dr. Deluna.   - repeat quant gold and Hep C quant PCR  - continue Humira pending above labs    # Pitted palmar keratolysis.   Feel that the patient's long-standing of hyperkeratotic, pitted plaques on the bilateral palms and to a lesser extent on the heels is best represented by pitted palmar keratolysis. Treatment options include keratolytics (urea appears to not be covered by insurance) and tretinoin. We will plan to start tretinoin today and consider OTC urea if not covered by insurance.    - start tretinoin 0.1% cream daily to palms  - consider OTC urea 40% cream if tretinoin not covered       Procedures Performed:   - none    Follow-up: 1 year(s) in-person, or earlier for new or changing lesions    Staff: Dr. Jina Palomino MD  PGY-2  Dermatology Resident  Pager: [phone]    I, Veronica Muro MD, saw this patient with the resident and agree with the resident s findings and plan of care as documented in the resident s note.    ____________________________________________    CC: Derm Problem (Minh states his psoriasis has been great lately )    HPI:  Mr. Minh Pereira is a(n) 37 year old male who presents today as a return patient for follow up of psoriasis on Humira. Last seen by Dr. Deluna on 5/24/23. Notes the following today:  -doing well on humira q2w  - no skin disease today  - joints are feeling well  - still having some scaling and pitting on his hands and to a lesser extent his feet. 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    Labs Reviewed:  Quant gold, Hep C RNA 2022 (both negative)    Physical Exam:  Vitals: There were no vitals taken for this visit.  SKIN: Focused examination of the face, abdomen, palms and soles was performed.  - on the palms especially at the MCP and PIP joints there are pits surrounded by hyperkeratotic scale  - some pitting and hyperkeratotic plaques present on the heels as well  - all skin examined is clear  - No other lesions of concern on areas examined.     Medications:  Current Outpatient Medications   Medication    adalimumab (HUMIRA *CF* PEN) 40 MG/0.4ML pen kit    adalimumab (HUMIRA *CF*) 80 MG/0.8ML pen kit    adalimumab (HUMIRA PEN) 40 MG/0.8ML pen kit    fluocinonide (LIDEX) 0.05 % ointment    ketoconazole (NIZORAL) 2 % cream    naproxen (NAPROSYN) 500 MG tablet    triamcinolone (KENALOG) 0.1 % ointment    urea (CARMOL) 10 % external cream    urea (GORMEL) 20 % external cream    vitamin D (ERGOCALCIFEROL) 76425 UNIT capsule     No current facility-administered medications for this visit.       Past Medical History:   Patient Active Problem List   Diagnosis    Psoriatic arthropathy (H)    Clavus    Backache    Hyperkeratosis of palms and soles    Psoriasis    Encounter for long-term (current) use of high-risk medication     Past Medical History:   Diagnosis Date    Arthritis     psoriatic arthritis    Hepatitis C     History of hepatitis C     SVR 12/16/16    Psoriasis     Psoriasis     Psoriatic arthritis (H)        [facility]  No address on file on close of this encounter.

## 2023-06-30 LAB
GAMMA INTERFERON BACKGROUND BLD IA-ACNC: 0.13 IU/ML
HCV RNA SERPL NAA+PROBE-ACNC: NOT DETECTED IU/ML
M TB IFN-G BLD-IMP: NEGATIVE
M TB IFN-G CD4+ BCKGRND COR BLD-ACNC: 9.87 IU/ML
MITOGEN IGNF BCKGRD COR BLD-ACNC: -0.03 IU/ML
MITOGEN IGNF BCKGRD COR BLD-ACNC: -0.04 IU/ML
QUANTIFERON MITOGEN: 10 IU/ML
QUANTIFERON NIL TUBE: 0.13 IU/ML
QUANTIFERON TB1 TUBE: 0.09 IU/ML
QUANTIFERON TB2 TUBE: 0.1

## 2023-08-10 DIAGNOSIS — L40.50 PSORIATIC ARTHRITIS (H): ICD-10-CM

## 2023-08-10 DIAGNOSIS — L40.9 PSORIASIS: ICD-10-CM

## 2023-08-16 RX ORDER — ADALIMUMAB 40MG/0.4ML
40 KIT SUBCUTANEOUS
Refills: 0 | OUTPATIENT
Start: 2023-08-16

## 2023-09-11 ENCOUNTER — TELEPHONE (OUTPATIENT)
Dept: DERMATOLOGY | Facility: CLINIC | Age: 38
End: 2023-09-11
Payer: COMMERCIAL

## 2023-09-11 DIAGNOSIS — L40.9 PSORIASIS: Primary | ICD-10-CM

## 2023-09-11 DIAGNOSIS — L40.50 PSORIATIC ARTHRITIS (H): ICD-10-CM

## 2023-09-11 NOTE — TELEPHONE ENCOUNTER
PA Initiation    Medication: HUMIRA *CF* PEN 40 MG/0.4ML SC PNKT  Insurance Company: [employer] - Phone [phone] Fax [phone]  Pharmacy Filling the Rx: [facility] - [place], [place] - [address] [place] [address]  Filling Pharmacy Phone:    Filling Pharmacy Fax:    Start Date: 9/11/2023      Key: [id number]

## 2023-09-11 NOTE — LETTER
9/19/2023    ATTN: Appeals & Grievances                                      Re: Prior Authorization Request                   Patient: Minh Pereira  Member ID: [id number]             YOB: 1985    To whom it may concern:    I am contacting you as a dermatologist caring for Minh Pereira with regard to the patient's diagnosis of Psoriasis (L40.9) and psoriatic arthritis (L40.50). I refilled this patient Humira (adalimumab), which required a prior authorization. Unfortunately, the prior authorization was denied and the patient is now unable to fill their prescription. I have reviewed the patient's diagnosis, care plan and clinical guidelines for treatment and request a formal appeal of your denial for Humira (adalimumab). Humira (adalimumab) is approved for the treatment of moderate-to-severe plaque psoriasis and psoriatic arthritis.     Psoriasis is a condition in which skin cells build up and form scales and itchy, dry patches. Psoriasis is thought to be an immune system problem. Triggers include infections, stress, and cold. The most common symptom is a rash on the skin, but sometimes the rash involves the nails or joints. Treatment aims to remove scales and stop skin cells from growing so quickly. Topical ointments, light therapy, and medications can offer relief for some patients.     When treating a patient with Psoriasis it is necessary to have access to the full spectrum of accepted treatments as patients may not be able to use one particular treatment due to lack of response, the potential for side effects or even an allergic reaction. It can become a serious safety issue for the patient if I am not able to prescribe a wide variety of treatments for this condition.    Treatment requested: adalimumab (Humira)  Requested Dose: 40 mg subcutaneous every 2 weeks    He was initially diagnosed with psoriasis in 2004. He was started on Humira in 2009 and has been taking on and off every since  (held only due to insurance issues). It has been very successful for him and we would like to continue. Failure to continue Humira will ultimately result in worsening of symptoms including his psoriatic arthritis, which could lead to a destructive arthropathy.     Minh has been on the following medications in the past for Psoriasis and psoriatic arthritis with limited success:   - methotrexate  - cyclosporine  - Enbrel   - Triamcinolone acetonide .01% ointment daily  - desonide ointment   - betamethosone ointments   - narrowband UVB three times a week    I strongly believe this patient needs access to Humira (adalimumab), as it has been successful for him for many years. Additionally, I request that you review the following evidence showing how this medication can be effectively utilized to treat Psoriasis (L40.9):    American Academy of Dermatology guideline on psoriasis available at https://www.aad.org/guidelines/psoriasis    On behalf of this patient, I would be grateful for your prompt reconsideration and approval of this medication. This patient will clearly suffer without this treatment. Please let me know if I can provide any further support, clarification, or assistance that will help result in [continued] coverage of this medication. Thank you for your time and consideration. I am happy to discuss this further and would appreciate the chance to do so if this medication is not approved.        Sincerely,    Trevon Deluna MD     Office contact:   Ofe Kate   Pharmacy Liaison Dermatology   P: [phone]   F: [phone]   [email or web address]

## 2023-09-18 NOTE — TELEPHONE ENCOUNTER
Was able to find active coverage pa now pending.    PA Initiation    Medication: HUMIRA *CF* PEN 40 MG/0.4ML SC PNKT  Insurance Company: [place] Medicaid ([facility]) - Phone [phone] Fax [phone]  Pharmacy Filling the Rx: [facility] - [place], [place] - [address] [place] [address]  Filling Pharmacy Phone:    Filling Pharmacy Fax:    Start Date: 9/11/2023      [id number]

## 2023-09-20 ENCOUNTER — VIRTUAL VISIT (OUTPATIENT)
Dept: RHEUMATOLOGY | Facility: CLINIC | Age: 38
End: 2023-09-20
Attending: CLINICAL NURSE SPECIALIST

## 2023-09-20 DIAGNOSIS — L40.9 PSORIASIS: Primary | ICD-10-CM

## 2023-09-20 NOTE — Clinical Note
9/20/2023       RE: Minh Pereira  [address]  [place] [place] [facility] [address]     Dear Colleague,    Thank you for referring your patient, Minh Pereira, to the [facility] RHEUMATOLOGY CLINIC [place] at [facility]. Please see a copy of my visit note below.    Medication Therapy Management (MTM) Encounter    ASSESSMENT:                            Plaque psoriasis and psoriatic arthritis: Symptoms stable at this time despite being off Humira, but will initiate appeal so that he can restart ASAP especially since he is losing his insurance again at the end of Sept. I encouraged him to set up an appointment with a PCP or urgent cre regarding his callus. Will be in touch with him regarding the appeal via phone/[employer].   We briefly reviewed his vaccines -- he will be due for a Tdap soon, but will wait for him to have insurance again. He is not interested in a flu or COVID vaccine right now. Also due for Prevnar 20 and Shingrix when he is back on immunosuppression, but will prioritize Tdap when he has insurance again.       PLAN:                            Appeal letter written for Humira 40 mg every 2 weeks     Follow-up: Pending Humira coverage via phone (mychart if he does not answer phone per patient preference)     SUBJECTIVE/OBJECTIVE:                          Minh Pereira is a 38 year old male { :[id number]} for {mtmvisit:[id number]}     Reason for visit: ***.  Medication Adherence/Access: Reports that he currently has insurance until the end of Sept, then will be losing his insurance. He does have a .     Plaque psoriasis and psoriatic arthritis:   - Humira 40 mg every 2 weeks (initially started ~2016, resumed 9/2020). He has not been taking since beginning of August due to insurance issues.   Since being off Humira, he has not had any skin or joint flares.   He is having a callus in between his toe which is painful and hard to walk. He  has been soaking it and removing skin with a blade. He does not have a primary care.   Many years of disease   Past medications used: methotrexate, Enbrel (flared on enbrel), triamcinolone ointment, desonide ointment     Pre-Biologic Screening:  -- Hep B Surface Antibody reactive on 9/29/20  -- Hep B Surface Antigen non-reactive on 9/29/20  -- Hep B Core Antibody  non-reactive on 9/29/20  Hep B vaccine (Twinrix) given 5/17/12  -- Hep C Antibody negative on 6/29/23. PMH of hep C s/p treatment; cleared HCV RNA in 2016     -- Yearly assessment for latent Tb (verbal screening and exam, PPD or QuantiFERON-Tb testing): negative on 6/29/23    Vaccinations:  All patients on biologics should avoid live vaccines (varicella/VZV, intranasal influenza, MMR, or yellow fever vaccine (if traveling))    -- Influenza (every year): n/a  -- TdaP (every 10 years): last 10/4/13  -- Pneumococcal Pneumonia    Prevnar-13: n/a   Pneumovax-23: n/a   Prevnar-20: n/a  -- COVID-19   Monovalent: 10/20/21 ([facility])   Bivalent: n/a   Updated (6408-9699 formula) mRNA dose: n/a  -- RSV (pending) n/a  -- Varicella/Zoster    Varicella n/a   Zoster n/a      Today's Vitals: There were no vitals taken for this visit.    Allergies/ADRs: Reviewed in chart  Past Medical History: Reviewed in chart  Tobacco: He reports that he has been smoking cigarettes. He has a 4.00 pack-year smoking history. He has never used smokeless tobacco.Nicotine/Tobacco Cessation Plan:   Information offered: Patient not interested at this time  Alcohol: reviewed in chart    ----------------      I spent 8 minutes with this patient today. All changes were made via collaborative practice agreement with Veronica Muro. A copy of the visit note was provided to the patient's provider(s).    A summary of these recommendations was declined by the patient.    Pinky German, Pharm.D., BCACP   Medication Therapy Management Pharmacist   [facility]    Telemedicine  Visit Details  Type of service:  Telephone visit  Start Time:  3:24 PM  End Time:  3:33 PM     Medication Therapy Recommendations  No medication therapy recommendations to display       Medication Therapy Management (MTM) Encounter    ASSESSMENT:                            Plaque psoriasis and psoriatic arthritis: Symptoms stable at this time despite being off Humira, but will initiate appeal so that he can restart ASAP especially since he is losing his insurance again at the end of Sept. Per Dr. Palomino, will continue 40 mg every 2 weeks without reloading. I encouraged him to set up an appointment with a PCP or urgent care regarding his callus. Will be in touch with him regarding the appeal via phone/[employer].   We briefly reviewed his vaccines -- he will be due for a Tdap soon, but will wait for him to have insurance again. He is not interested in a flu or COVID vaccine right now. Also due for Prevnar 20 and Shingrix when he is back on immunosuppression, but will prioritize Tdap when he has insurance again.       PLAN:                            Appeal letter written for Humira 40 mg every 2 weeks     Follow-up: Pending Humira coverage via phone ([employer] if he does not answer phone per patient preference)     SUBJECTIVE/OBJECTIVE:                          Minh Pereira is a 38 year old male called for an initial visit. He was referred to me from Dr. Muro.      Reason for visit: Humira appeal.  Medication Adherence/Access: Reports that he currently has insurance until the end of Sept, then will be losing his insurance. He does have a .     Plaque psoriasis and psoriatic arthritis:   - Humira 40 mg every 2 weeks (initially started ~2016, resumed 9/2020). He has not been taking since beginning of August due to insurance issues.   Since being off Humira, he has not had any skin or joint flares.   He is having a callus in between his toe which is painful and hard to walk. He has been soaking it and  removing skin with a blade. He does not have a primary care.   Many years of disease   Past medications used: methotrexate, Enbrel (flared on enbrel), triamcinolone ointment, desonide ointment     Pre-Biologic Screening:  -- Hep B Surface Antibody reactive on 9/29/20  -- Hep B Surface Antigen non-reactive on 9/29/20  -- Hep B Core Antibody  non-reactive on 9/29/20  Hep B vaccine (Twinrix) given 5/17/12  -- Hep C Antibody negative on 6/29/23. PMH of hep C s/p treatment; cleared HCV RNA in 2016     -- Yearly assessment for latent Tb (verbal screening and exam, PPD or QuantiFERON-Tb testing): negative on 6/29/23    Vaccinations:  All patients on biologics should avoid live vaccines (varicella/VZV, intranasal influenza, MMR, or yellow fever vaccine (if traveling))    -- Influenza (every year): n/a  -- TdaP (every 10 years): last 10/4/13  -- Pneumococcal Pneumonia    Prevnar-13: n/a   Pneumovax-23: n/a   Prevnar-20: n/a  -- COVID-19   Monovalent: 10/20/21 ([facility])   Bivalent: n/a   Updated ([id number] formula) mRNA dose: n/a  -- RSV (pending) n/a  -- Varicella/Zoster    Varicella n/a   Zoster n/a      Today's Vitals: There were no vitals taken for this visit.    Allergies/ADRs: Reviewed in chart  Past Medical History: Reviewed in chart  Tobacco: He reports that he has been smoking cigarettes. He has a 4.00 pack-year smoking history. He has never used smokeless tobacco.Nicotine/Tobacco Cessation Plan:   Information offered: Patient not interested at this time  Alcohol: reviewed in chart    ----------------      I spent 8 minutes with this patient today. All changes were made via collaborative practice agreement with Veronica Muro. A copy of the visit note was provided to the patient's provider(s).    A summary of these recommendations was declined by the patient.    Pinky German, Pharm.D., BCACP   Medication Therapy Management Pharmacist   [facility]    Telemedicine Visit Details  Type of  service:  Telephone visit  Start Time:  3:24 PM  End Time:  3:33 PM     Medication Therapy Recommendations  No medication therapy recommendations to display         Again, thank you for allowing me to participate in the care of your patient.      Sincerely,    Ankit KNOTTD

## 2023-09-20 NOTE — TELEPHONE ENCOUNTER
PRIOR AUTHORIZATION DENIED    Medication: HUMIRA *CF* PEN 40 MG/0.4ML SC PNKT  Insurance Company: [place] Medicaid ([facility]) - Phone [phone] Fax [phone]  Denial Date: 9/19/2023  Denial Rational:     Appeal Information: [phone]  Patient Notified:

## 2023-09-20 NOTE — TELEPHONE ENCOUNTER
MTM would be able to meet with the patient and write the appeal if Dr. Palomino agrees. Lets me know -- thanks!

## 2023-09-20 NOTE — PROGRESS NOTES
Medication Therapy Management (MTM) Encounter    ASSESSMENT:                            Plaque psoriasis and psoriatic arthritis: Symptoms stable at this time despite being off Humira, but will initiate appeal so that he can restart ASAP especially since he is losing his insurance again at the end of Sept. Per Dr. Palomino, will continue 40 mg every 2 weeks without reloading. I encouraged him to set up an appointment with a PCP or urgent care regarding his callus. Will be in touch with him regarding the appeal via phone/[employer].   We briefly reviewed his vaccines -- he will be due for a Tdap soon, but will wait for him to have insurance again. He is not interested in a flu or COVID vaccine right now. Also due for Prevnar 20 and Shingrix when he is back on immunosuppression, but will prioritize Tdap when he has insurance again.       PLAN:                            Appeal letter written for Humira 40 mg every 2 weeks     Follow-up: Pending Humira coverage via phone (mychart if he does not answer phone per patient preference)     SUBJECTIVE/OBJECTIVE:                          Minh Pereira is a 38 year old male called for an initial visit. He was referred to me from Dr. Muro.      Reason for visit: Humira appeal.  Medication Adherence/Access: Reports that he currently has insurance until the end of Sept, then will be losing his insurance. He does have a .     Plaque psoriasis and psoriatic arthritis:   - Humira 40 mg every 2 weeks (initially started ~2016, resumed 9/2020). He has not been taking since beginning of August due to insurance issues.   Since being off Humira, he has not had any skin or joint flares.   He is having a callus in between his toe which is painful and hard to walk. He has been soaking it and removing skin with a blade. He does not have a primary care.   Many years of disease   Past medications used: methotrexate, Enbrel (flared on enbrel), triamcinolone ointment, desonide  ointment     Pre-Biologic Screening:  -- Hep B Surface Antibody reactive on 9/29/20  -- Hep B Surface Antigen non-reactive on 9/29/20  -- Hep B Core Antibody  non-reactive on 9/29/20  Hep B vaccine (Twinrix) given 5/17/12  -- Hep C Antibody negative on 6/29/23. PMH of hep C s/p treatment; cleared HCV RNA in 2016     -- Yearly assessment for latent Tb (verbal screening and exam, PPD or QuantiFERON-Tb testing): negative on 6/29/23    Vaccinations:  All patients on biologics should avoid live vaccines (varicella/VZV, intranasal influenza, MMR, or yellow fever vaccine (if traveling))    -- Influenza (every year): n/a  -- TdaP (every 10 years): last 10/4/13  -- Pneumococcal Pneumonia    Prevnar-13: n/a   Pneumovax-23: n/a   Prevnar-20: n/a  -- COVID-19   Monovalent: 10/20/21 (Derrick)   Bivalent: n/a   Updated (4370-4822 formula) mRNA dose: n/a  -- RSV (pending) n/a  -- Varicella/Zoster    Varicella n/a   Zoster n/a      Today's Vitals: There were no vitals taken for this visit.    Allergies/ADRs: Reviewed in chart  Past Medical History: Reviewed in chart  Tobacco: He reports that he has been smoking cigarettes. He has a 4.00 pack-year smoking history. He has never used smokeless tobacco.Nicotine/Tobacco Cessation Plan:   Information offered: Patient not interested at this time  Alcohol: reviewed in chart    ----------------      I spent 8 minutes with this patient today. All changes were made via collaborative practice agreement with Veronica Muro. A copy of the visit note was provided to the patient's provider(s).    A summary of these recommendations was declined by the patient.    Pinky German, Pharm.D., BCACP   Medication Therapy Management Pharmacist   [facility]    Telemedicine Visit Details  Type of service:  Telephone visit  Start Time:  3:24 PM  End Time:  3:33 PM     Medication Therapy Recommendations  No medication therapy recommendations to display

## 2023-09-22 NOTE — TELEPHONE ENCOUNTER
Medication Appeal Initiation    We have initiated an appeal for the requested medication:  Medication: HUMIRA *CF* PEN 40 MG/0.4ML SC PNKT  Appeal Start Date:  9/22/2023  Insurance Company: [place] Medicaid  Insurance Phone: [phone]  Insurance Fax: [phone]  Comments:  [phone]

## 2023-09-25 NOTE — TELEPHONE ENCOUNTER
MEDICATION APPEAL APPROVED    Medication: HUMIRA *CF* PEN 40 MG/0.4ML SC PNKT  Authorization Effective Date: 9/22/2023  Authorization Expiration Date: 8/1/2024  Approved Dose/Quantity: 2 pens per 28 days  Reference #: [id number]   Appeal Insurance Company: [place] Medicaid  Expected CoPay:       CoPay Card Available:    Financial Assistance Needed:   Which Pharmacy is filling the prescription: [facility] - [place], [place] - [address] [place] [address]  Patient Notified: Yes

## 2023-09-26 RX ORDER — ADALIMUMAB 40MG/0.4ML
40 KIT SUBCUTANEOUS
Qty: 0.8 ML | Refills: 2 | Status: SHIPPED | OUTPATIENT
Start: 2023-09-26

## 2023-09-26 NOTE — TELEPHONE ENCOUNTER
New rx sent to  Specialty pharmacy. Patient hs been off medication, please set up order (he is losing insurance end of the month).

## 2023-10-18 ENCOUNTER — MYC MEDICAL ADVICE (OUTPATIENT)
Dept: PHARMACY | Facility: CLINIC | Age: 38
End: 2023-10-18
Payer: COMMERCIAL

## 2023-10-25 NOTE — TELEPHONE ENCOUNTER
Called Minh to let him know that we submitted the provider portion of the PAP, and he can complete the patient portion. He has seen improvement from Humira already -- last dose in a few days. He plans on logging into [employer] to complete the form today. Will follow up with him in a week if not done yet.

## 2023-10-25 NOTE — TELEPHONE ENCOUNTER
FREE DRUG APPLICATION INITIATED    Medication: HUMIRA *CF* PEN 40 MG/0.4ML SC PNKT  Free Drug Program Name: [facility]   Date Submitted: 10/25/23  Phone #: [phone]  Fax #: [phone]  Additional Information: faxed provider portion

## 2023-10-27 NOTE — TELEPHONE ENCOUNTER
Per my chart message patient is currently working on application will check for update early next week

## 2023-11-08 NOTE — TELEPHONE ENCOUNTER
Spoke with hector and they dont see provider portion in system but rep says it can take a few days to show up. Refaxed again to be safe and will check back again later this week

## 2023-11-22 NOTE — TELEPHONE ENCOUNTER
Sent my chart message,hector needs to speak with patient to confirm he doesn't have any insurance. Sent message to patient to call my hector

## 2023-11-29 NOTE — TELEPHONE ENCOUNTER
Sent message to patient to see if he has had a chance to call my [employer] to confirm he doesn't have insurance.

## 2023-12-08 NOTE — TELEPHONE ENCOUNTER
FREE DRUG APPLICATION APPROVED    Medication: HUMIRA *CF* PEN 40 MG/0.4ML SC PNKT  Program Name: [facility]   Effective Date: 12/7/2023  Expiration Date: 12/7/2024  Pharmacy Filling the Rx: [facility] - [place], [place] - [address] [place] [address]  Patient Notified: yes  Additional Information:

## 2023-12-15 ENCOUNTER — MYC MEDICAL ADVICE (OUTPATIENT)
Dept: RHEUMATOLOGY | Facility: CLINIC | Age: 38
End: 2023-12-15
Payer: COMMERCIAL

## 2024-01-02 NOTE — TELEPHONE ENCOUNTER
I was able to talk to Minh -- reports he was able to get a one month shipment from [employer]. He knows how to place refills.     Will follow up with him in the summer, but encouraged patient to reach out if he needs anything before then.     Pinky German, Pharm.D., [facility]   Medication Therapy Management Pharmacist   [facility]

## 2024-05-01 ENCOUNTER — TELEPHONE (OUTPATIENT)
Dept: DERMATOLOGY | Facility: CLINIC | Age: 39
End: 2024-05-01
Payer: COMMERCIAL

## 2024-05-16 ENCOUNTER — LAB (OUTPATIENT)
Dept: LAB | Facility: CLINIC | Age: 39
End: 2024-05-16
Payer: COMMERCIAL

## 2024-05-16 ENCOUNTER — OFFICE VISIT (OUTPATIENT)
Dept: INTERNAL MEDICINE | Facility: CLINIC | Age: 39
End: 2024-05-16
Payer: COMMERCIAL

## 2024-05-16 VITALS
HEART RATE: 63 BPM | OXYGEN SATURATION: 100 % | DIASTOLIC BLOOD PRESSURE: 73 MMHG | WEIGHT: 167 LBS | HEIGHT: 70 IN | SYSTOLIC BLOOD PRESSURE: 115 MMHG | BODY MASS INDEX: 23.91 KG/M2

## 2024-05-16 DIAGNOSIS — Z13.6 CARDIOVASCULAR SCREENING; LDL GOAL LESS THAN 160: ICD-10-CM

## 2024-05-16 DIAGNOSIS — B18.2 HEPATITIS C CARRIER (H): ICD-10-CM

## 2024-05-16 DIAGNOSIS — R20.9 COLD SENSATION OF SKIN: ICD-10-CM

## 2024-05-16 DIAGNOSIS — Z86.19 HISTORY OF HEPATITIS C: ICD-10-CM

## 2024-05-16 DIAGNOSIS — Z11.3 SCREENING EXAMINATION FOR STI: ICD-10-CM

## 2024-05-16 DIAGNOSIS — L40.50 PSORIASIS WITH ARTHROPATHY (H): ICD-10-CM

## 2024-05-16 DIAGNOSIS — Z00.00 VISIT FOR PREVENTIVE HEALTH EXAMINATION: Primary | ICD-10-CM

## 2024-05-16 DIAGNOSIS — E55.9 VITAMIN D DEFICIENCY: ICD-10-CM

## 2024-05-16 LAB
ALBUMIN SERPL BCG-MCNC: 4.9 G/DL (ref 3.5–5.2)
ALP SERPL-CCNC: 82 U/L (ref 40–150)
ALT SERPL W P-5'-P-CCNC: 13 U/L (ref 0–70)
ANION GAP SERPL CALCULATED.3IONS-SCNC: 10 MMOL/L (ref 7–15)
AST SERPL W P-5'-P-CCNC: 18 U/L (ref 0–45)
BILIRUB SERPL-MCNC: 0.3 MG/DL
BUN SERPL-MCNC: 14.9 MG/DL (ref 6–20)
C TRACH DNA SPEC QL NAA+PROBE: NEGATIVE
CALCIUM SERPL-MCNC: 9.2 MG/DL (ref 8.6–10)
CHLORIDE SERPL-SCNC: 104 MMOL/L (ref 98–107)
CHOLEST SERPL-MCNC: 124 MG/DL
CREAT SERPL-MCNC: 0.9 MG/DL (ref 0.67–1.17)
DEPRECATED HCO3 PLAS-SCNC: 27 MMOL/L (ref 22–29)
EGFRCR SERPLBLD CKD-EPI 2021: >90 ML/MIN/1.73M2
ERYTHROCYTE [DISTWIDTH] IN BLOOD BY AUTOMATED COUNT: 13.9 % (ref 10–15)
FASTING STATUS PATIENT QL REPORTED: YES
FASTING STATUS PATIENT QL REPORTED: YES
GLUCOSE SERPL-MCNC: 92 MG/DL (ref 70–99)
HBA1C MFR BLD: 5.2 %
HCT VFR BLD AUTO: 44.9 % (ref 40–53)
HDLC SERPL-MCNC: 84 MG/DL
HGB BLD-MCNC: 15.4 G/DL (ref 13.3–17.7)
HIV 1+2 AB+HIV1 P24 AG SERPL QL IA: NONREACTIVE
INR PPP: 1 (ref 0.85–1.15)
LDLC SERPL CALC-MCNC: 26 MG/DL
MCH RBC QN AUTO: 30.8 PG (ref 26.5–33)
MCHC RBC AUTO-ENTMCNC: 34.3 G/DL (ref 31.5–36.5)
MCV RBC AUTO: 90 FL (ref 78–100)
N GONORRHOEA DNA SPEC QL NAA+PROBE: NEGATIVE
NONHDLC SERPL-MCNC: 40 MG/DL
PLATELET # BLD AUTO: 278 10E3/UL (ref 150–450)
POTASSIUM SERPL-SCNC: 4 MMOL/L (ref 3.4–5.3)
PROT SERPL-MCNC: 7.3 G/DL (ref 6.4–8.3)
RBC # BLD AUTO: 5 10E6/UL (ref 4.4–5.9)
SODIUM SERPL-SCNC: 141 MMOL/L (ref 135–145)
T PALLIDUM AB SER QL: NONREACTIVE
TRIGL SERPL-MCNC: 72 MG/DL
TSH SERPL DL<=0.005 MIU/L-ACNC: 1.87 UIU/ML (ref 0.3–4.2)
VIT D+METAB SERPL-MCNC: 16 NG/ML (ref 20–50)
WBC # BLD AUTO: 7.9 10E3/UL (ref 4–11)

## 2024-05-16 PROCEDURE — 82306 VITAMIN D 25 HYDROXY: CPT | Performed by: HOSPITALIST

## 2024-05-16 PROCEDURE — 87491 CHLMYD TRACH DNA AMP PROBE: CPT | Performed by: HOSPITALIST

## 2024-05-16 PROCEDURE — 36415 COLL VENOUS BLD VENIPUNCTURE: CPT | Performed by: PATHOLOGY

## 2024-05-16 PROCEDURE — 87389 HIV-1 AG W/HIV-1&-2 AB AG IA: CPT | Performed by: HOSPITALIST

## 2024-05-16 PROCEDURE — 84443 ASSAY THYROID STIM HORMONE: CPT | Performed by: PATHOLOGY

## 2024-05-16 PROCEDURE — 80061 LIPID PANEL: CPT | Performed by: PATHOLOGY

## 2024-05-16 PROCEDURE — 90472 IMMUNIZATION ADMIN EACH ADD: CPT

## 2024-05-16 PROCEDURE — 90471 IMMUNIZATION ADMIN: CPT

## 2024-05-16 PROCEDURE — 85027 COMPLETE CBC AUTOMATED: CPT | Performed by: PATHOLOGY

## 2024-05-16 PROCEDURE — 99000 SPECIMEN HANDLING OFFICE-LAB: CPT | Performed by: PATHOLOGY

## 2024-05-16 PROCEDURE — 86780 TREPONEMA PALLIDUM: CPT | Performed by: HOSPITALIST

## 2024-05-16 PROCEDURE — 85610 PROTHROMBIN TIME: CPT | Performed by: PATHOLOGY

## 2024-05-16 PROCEDURE — 80053 COMPREHEN METABOLIC PANEL: CPT | Performed by: PATHOLOGY

## 2024-05-16 PROCEDURE — 87522 HEPATITIS C REVRS TRNSCRPJ: CPT | Performed by: HOSPITALIST

## 2024-05-16 PROCEDURE — 87591 N.GONORRHOEAE DNA AMP PROB: CPT | Performed by: HOSPITALIST

## 2024-05-16 PROCEDURE — 99385 PREV VISIT NEW AGE 18-39: CPT | Mod: 25

## 2024-05-16 PROCEDURE — 83036 HEMOGLOBIN GLYCOSYLATED A1C: CPT | Performed by: HOSPITALIST

## 2024-05-16 PROCEDURE — 90715 TDAP VACCINE 7 YRS/> IM: CPT

## 2024-05-16 PROCEDURE — 90746 HEPB VACCINE 3 DOSE ADULT IM: CPT

## 2024-05-16 RX ORDER — EPINEPHRINE 0.3 MG/.3ML
0.3 INJECTION SUBCUTANEOUS PRN
Qty: 2 EACH | Refills: 3 | Status: CANCELLED | OUTPATIENT
Start: 2024-05-16

## 2024-05-16 ASSESSMENT — PAIN SCALES - GENERAL: PAINLEVEL: NO PAIN (0)

## 2024-05-16 NOTE — PROGRESS NOTES
Minh Pereira is a 38-year-old male with PMHx of Psoriasis with Psoriatic arthritis on Humira, chronic hepatitis C s/p Harvoni in sustained viral remission, and vitamin D deficiency who presents to clinic on 5/16/2024 for annual preventative visit.    Annual preventative visit  STD screening  Hx vitamin D deficiency  Plan to compete preventative screening with lab workup including CBC, CMP, lipid panel and A1c.  Has history of vitamin D deficiency not currently on supplement, will screen for vitamin D deficiency with plan for high-dose replacement as needed.  Notes often feels cold with ongoing dry skin in the setting of psoriasis; no associated fatigue, low mood, unintentional weight gain, hair loss, or brittle nails; will screen with TSH with reflex to T4. Would like to complete general STD screening; not currently symptomatic.  Plan to administer hepatitis B and tetanus booster vaccines in clinic; plan to complete pneumococcal and COVID vaccines outpatient pharmacy.  - CBC, CMP, INR, lipid panel, TSH with reflex to T4, A1c  - Vitamin D levels  - HIV, chlamydia, gonorrhea, treponema  - Preventative vaccines  Administer hepatitis B vaccine, 2 out of 3 doses    Next due in 4 to 6 months  Administer tetanus booster  Next due 2034  - Plan for pneumococcal, COVID vaccine at outpatient pharmacy    Hx Chronic hepatitis C in sustained viral remission  Has distant history of hepatitis C status post 8 weeks of Harvoni (2016) in sustained viral remission since 2016. No history of associated cirrhosis (RUQ ultrasound without cirrhosis 2014) or hepatic coma. Viral titers recently checked and undetectable in 2022.  Plan to screen for ongoing remission.  - Quantitative hepatitis C levels    Psoriasis with Psoriatic arthritis  Has history of plaque psoriasis complicated by psoriatic arthritis.  Previously followed by rheumatology and outside healthcare system currently on adalimumab.  With change in health insurance, needs to  establish with new provider. Psoriatic arthritis intermittently affects toes, ankles, knees. Currently manages joint symptoms with regular exercise and benzocaine topical ointment. Would be interested in trying Voltaren gel for anti-inflammatory benefit.  Notes that he has plaque on right fifth toe which he intermittently needs to soak and debride with a razor; would be interested in meeting with podiatry for additional input and management.  - Continue Adalimumab  - Start Voltaren gel  - Rheumatology referral  - Podiatry referral    Regular alcohol use  Notes that has family history of alcohol use disorder complicated by cirrhosis.  Is currently regularly drinking alcohol; several shots of tequila after work every day.  Has previously struggled with excess alcohol use but notes that currently meeting with therapist twice weekly and does not feel that alcohol use is uncontrolled.  Would like to screen for liver dysfunction to make sure everything looks okay.  Talked about plan for right upper quadrant ultrasound with signs of liver dysfunction on lab test.  Plan to continue meeting with therapist, can return to clinic for additional resources as needed.  - Continue outpatient therapy twice weekly  - CMP, INR  - With signs of liver dysfunction on lab tests, can consider right upper quadrant ultrasound to screen for alcohol-associated hepatitis/cirrhosis.

## 2024-05-16 NOTE — PATIENT INSTRUCTIONS
Please visit outpatient pharmacy to complete vaccinates for pneumococcal vaccine and COVID-19 vaccine. Please follow-up in clinic with questions or within one year for annual visit.

## 2024-05-16 NOTE — PROGRESS NOTES
Preventive Care Visit  [facility]  Alla Monroe MD, Internal Medicine  May 16, 2024      Assessment & Plan   Minh Pereira is a 38-year-old male with PMHx of Psoriasis with Psoriatic arthritis on Humira, chronic hepatitis C s/p Harvoni in sustained viral remission, and vitamin D deficiency who presents to clinic on 5/16/2024 for annual preventative visit.     Annual preventative visit  STD screening  Hx vitamin D deficiency  Plan to compete preventative screening with lab workup including CBC, CMP, lipid panel and A1c; patient is not fasting  Has history of vitamin D deficiency not currently on supplement, will screen for vitamin D deficiency with plan for high-dose replacement as needed.  Notes often feels cold with ongoing dry skin in the setting of psoriasis; no associated fatigue, low mood, unintentional weight gain, hair loss, or brittle nails; will screen with TSH with reflex to T4. Would like to complete general STD screening; not currently symptomatic.  Plan to administer hepatitis B and tetanus booster vaccines in clinic; plan to complete pneumococcal and COVID vaccines outpatient pharmacy.  - CBC, CMP, INR, lipid panel, TSH with reflex to T4, A1c  - Vitamin D levels  - HIV, chlamydia, gonorrhea, treponema  - Preventative vaccines  Administer hepatitis B vaccine, 2 out of 3 doses                          Next due in 4 to 6 months  Administer tetanus booster  Next due 2034  - Plan for pneumococcal, COVID vaccine at outpatient pharmacy     Hx Chronic hepatitis C in sustained viral remission  Has distant history of hepatitis C status post 8 weeks of Harvoni (2016) in sustained viral remission since 2016. No history of associated cirrhosis (RUQ ultrasound without cirrhosis 2014) or hepatic coma. Viral titers recently checked and undetectable in 2022.  Plan to screen for ongoing remission.  - Quantitative hepatitis C levels     Psoriasis with Psoriatic  arthritis  Has history of plaque psoriasis complicated by psoriatic arthritis.  Previously followed by rheumatology and outside healthcare system currently on adalimumab.  With change in health insurance, needs to establish with new provider. Psoriatic arthritis intermittently affects toes, ankles, knees. Currently manages joint symptoms with regular exercise and benzocaine topical ointment. Would be interested in trying Voltaren gel for anti-inflammatory benefit.  Notes that he has plaque on right fifth toe which he intermittently needs to soak and debride with a razor; would be interested in meeting with podiatry for additional input and management.  - Continue Adalimumab  - Start Voltaren gel  - Rheumatology referral  - Podiatry referral     Regular alcohol use  Notes that has family history of alcohol use disorder complicated by cirrhosis.  Is currently regularly drinking alcohol; several shots of tequila after work every day.  Has previously struggled with excess alcohol use but notes that currently meeting with therapist twice weekly and does not feel that alcohol use is uncontrolled.  Would like to screen for liver dysfunction to make sure everything looks okay.  Talked about plan for right upper quadrant ultrasound with signs of liver dysfunction on lab test.  Plan to continue meeting with therapist, can return to clinic for additional resources as needed.  - Continue outpatient therapy twice weekly  - CMP, INR  - With signs of liver dysfunction on lab tests, can consider right upper quadrant ultrasound to screen for alcohol-associated hepatitis/cirrhosis.      MEDICATIONS:        - Trial of Voltaren gel for Psoriatic arthritis       - Continue other medications without change  CONSULTATION/REFERRALS:       - Rheumatology to establish care with new health insurance, manage prescriptions for plaque psoriasis       - Podiatry for Psoriatic arthritis affecting feet, painful corns, input on management of plaque  on right fifth toe of unclear etiology  FUTURE LABS:       - Schedule labs for CBC, CMP, INR, lipid panel, A1c, TSH with reflex to T4, HIV, chlamydia, gonorrhea, treponema  FUTURE APPOINTMENTS:       - Follow-up for annual visit or as needed    No follow-ups on file.    Jose E Wilkinson is a 38 year old, presenting for the following:  Establish Care        5/16/2024     7:38 AM   Additional Questions   Roomed by MVO, EMT          History of Present Illness       Reason for visit:  Preventive care    He eats 2-3 servings of fruits and vegetables daily.He consumes 2 sweetened beverage(s) daily.He exercises with enough effort to increase his heart rate 30 to 60 minutes per day.  He exercises with enough effort to increase his heart rate 5 days per week.   He is taking medications regularly.          5/15/2024   Social Factors   Worry food won't last until get money to buy more No   Food not last or not have enough money for food? No   Do you have housing?  Yes   Are you worried about losing your housing? No   Lack of transportation? No   Unable to get utilities (heat,electricity)? No     Today's PHQ-2 Score:       5/15/2024     9:38 PM   PHQ-2 ( 1999 [employer])   Q1: Little interest or pleasure in doing things 0   Q2: Feeling down, depressed or hopeless 0   PHQ-2 Score 0   Q1: Little interest or pleasure in doing things Not at all   Q2: Feeling down, depressed or hopeless Not at all   PHQ-2 Score 0       Social History     Tobacco Use    Smoking status: Every Day     Current packs/day: 0.50     Average packs/day: 0.5 packs/day for 8.0 years (4.0 ttl pk-yrs)     Types: Cigarettes    Smokeless tobacco: Never   Substance Use Topics    Alcohol use: Yes     Alcohol/week: 9.0 standard drinks of alcohol     Types: 9 Shots of liquor per week     Comment: Several shots of tequila after work daily    Drug use: Yes     Types: Marijuana            Reviewed and updated as needed this visit by Provider                   Past  "Medical History:   Diagnosis Date    History of hepatitis C     SVR 12/16/16    History of vitamin D deficiency     Psoriasis with arthropathy (H)      Review of Systems  Constitutional, HEENT, cardiovascular, pulmonary, gi and gu systems are negative, except as otherwise noted.     Objective    Exam  /73 (BP Location: Left arm, Patient Position: Sitting, Cuff Size: Adult Regular)   Pulse 63   Ht 1.778 m (5' 10\")   Wt 75.8 kg (167 lb)   SpO2 100%   BMI 23.96 kg/m     Estimated body mass index is 23.96 kg/m  as calculated from the following:    Height as of this encounter: 1.778 m (5' 10\").    Weight as of this encounter: 75.8 kg (167 lb).    Physical Exam  Constitutional:       Appearance: Normal appearance.   HENT:      Head: Normocephalic and atraumatic.      Nose: Nose normal. No congestion or rhinorrhea.      Mouth/Throat:      Mouth: Mucous membranes are moist.   Eyes:      Conjunctiva/sclera: Conjunctivae normal.      Pupils: Pupils are equal, round, and reactive to light.   Cardiovascular:      Rate and Rhythm: Normal rate and regular rhythm.   Pulmonary:      Effort: Pulmonary effort is normal. No respiratory distress.      Breath sounds: No wheezing.   Abdominal:      General: Abdomen is flat.   Musculoskeletal:         General: No swelling.      Right lower leg: No edema.      Left lower leg: No edema.      Right foot: No deformity or bunion.      Left foot: No deformity or bunion.   Feet:      Comments: Corn on 5th toe of right foot, skin colored scaly plaque on between 4th and 5th toe of right foot different in appearance from maroon colored psoriatic plaques on legs  Skin:     General: Skin is warm and dry.   Neurological:      General: No focal deficit present.      Mental Status: He is alert and oriented to person, place, and time.   Psychiatric:         Mood and Affect: Mood normal.       Signed Electronically by: Alla Monroe MD    "

## 2024-05-17 RX ORDER — ERGOCALCIFEROL 1.25 MG/1
50000 CAPSULE, LIQUID FILLED ORAL WEEKLY
Qty: 8 CAPSULE | Refills: 0 | Status: SHIPPED | OUTPATIENT
Start: 2024-05-17

## 2024-05-18 DIAGNOSIS — E55.9 VITAMIN D DEFICIENCY: Primary | ICD-10-CM

## 2024-05-18 LAB — HCV RNA SERPL NAA+PROBE-ACNC: NOT DETECTED IU/ML

## 2024-05-18 RX ORDER — CHOLECALCIFEROL (VITAMIN D3) 50 MCG
1 TABLET ORAL DAILY
Qty: 90 TABLET | Refills: 3 | Status: SHIPPED | OUTPATIENT
Start: 2024-05-18

## 2024-05-21 ENCOUNTER — TELEPHONE (OUTPATIENT)
Dept: INTERNAL MEDICINE | Facility: CLINIC | Age: 39
End: 2024-05-21
Payer: COMMERCIAL

## 2024-05-21 NOTE — TELEPHONE ENCOUNTER
Retail Pharmacy Prior Authorization Team   Phone: [phone]    PRIOR AUTHORIZATION DENIED    Medication: VITAMIN D3 50 MCG (2000 UT) PO TABS  Insurance Company: [employer] - Phone [phone] Fax [phone]  Denial Date: 5/21/2024  Denial Reason(s): OTC DRUGS ARE EXCLUDED FROM COVERAGE      Appeal Information: IF THE PROVIDER WOULD LIKE TO APPEAL THIS DECISION PLEASE PROVIDE THE PA TEAM WITH A LETTER OF MEDICAL NECESSITY      Patient Notified: NO

## 2024-05-21 NOTE — TELEPHONE ENCOUNTER
Retail Pharmacy Prior Authorization Team   Phone: [phone]      EPA DENIED: WAITING ON LETTER

## 2024-05-26 ENCOUNTER — HEALTH MAINTENANCE LETTER (OUTPATIENT)
Age: 39
End: 2024-05-26

## 2024-09-25 ENCOUNTER — PRE VISIT (OUTPATIENT)
Dept: RHEUMATOLOGY | Facility: CLINIC | Age: 39
End: 2024-09-25
Payer: COMMERCIAL

## 2024-11-18 ENCOUNTER — MYC MEDICAL ADVICE (OUTPATIENT)
Dept: PHARMACY | Facility: CLINIC | Age: 39
End: 2024-11-18
Payer: COMMERCIAL

## 2025-01-06 ENCOUNTER — TELEPHONE (OUTPATIENT)
Dept: DERMATOLOGY | Facility: CLINIC | Age: 40
End: 2025-01-06

## 2025-01-07 ENCOUNTER — TELEPHONE (OUTPATIENT)
Dept: DERMATOLOGY | Facility: CLINIC | Age: 40
End: 2025-01-07
Payer: COMMERCIAL

## 2025-01-07 NOTE — TELEPHONE ENCOUNTER
1/7 Patient confirmed scheduled appointment:  Date: 1/16/25  Time: 8:20 am  Visit type: Return Dermatology  Provider: Candelario  Location: [facility]  Testing/imaging: N/A  Additional notes: N/A

## 2025-02-17 ENCOUNTER — TELEPHONE (OUTPATIENT)
Dept: DERMATOLOGY | Facility: CLINIC | Age: 40
End: 2025-02-17
Payer: COMMERCIAL

## 2025-02-17 NOTE — TELEPHONE ENCOUNTER
Called patient -- he has one more pen of Humira left at home (due 3/2) He is aware that he missed his appointment, reports work conflicts and he would like to reschedule. Can someone call him to set up a follow up? Thank you!

## 2025-02-17 NOTE — TELEPHONE ENCOUNTER
2/17 Left Voicemail (1st Attempt) and Sent Mychart (1st Attempt) for the patient to call back and schedule the following:    Appointment type: Return Dermatology  Provider: Any Resident  Return date: Next available  Specialty phone number: [phone]  Additional appointment(s) needed: na  Additonal Notes: na

## 2025-02-19 ENCOUNTER — TELEPHONE (OUTPATIENT)
Dept: DERMATOLOGY | Facility: CLINIC | Age: 40
End: 2025-02-19

## 2025-02-19 NOTE — TELEPHONE ENCOUNTER
2/19 Patient confirmed scheduled appointment:  Date: 2/27/2025  Time: 8:50 AM  Visit type: Return Dermatology  Provider: Cristina  Location: [facility]  Testing/imaging: n/a  Additional notes: n/a

## 2025-02-26 NOTE — PROGRESS NOTES
[facility] Dermatology Note  Encounter Date: Feb 27, 2025  Office Visit     Dermatology Problem List:  # Plaque psoriasis and psoriatic arthritis: Many years of disease.   - Has done well on Humira, initially started ~2016, resumed 9/2020  - s/p methotrexate, failed Enbrel/flared on enbrel  - Pertinent PMH of hep C s/p treatment; cleared HCV RNA in 2016  - s/p triamcinolone ointment, desonide ointment  # Pitted palmar keratolysis  - Urea 20% cream  # Onychomycosis. culture 08/11/2016, s/p terbinafine 250 mg daily 2017  ____________________________________________    Assessment & Plan:     # Plaque psoriasis with psoriatic arthritis. Chronic, at goal of clear on Humira since 2020. Last quant gold and Hep C RNA negative in June 2023. Will plan to complete these lab today and continue with Humira. Patient is not requiring topicals and declines refills at this time.    - Labs today: quant gold and Hep C RNA PCR  - Continue Humira pending above labs  - Continue topicals with triamcinolone to body BID PRN (declined refills today)      # Pitted palmar keratolysis, palms and soles   Reviewed diagnosis and treatment options. Patient reports trialing urea cream on palms infrequently which does help. We discussed optimization of topicals and application to the soles as well.   - Increase use of  urea 20% cream to daily to palms and soles     Procedures Performed:   None    Follow-up: 1 year(s) in-person, or earlier for new or changing lesions    Staff and Resident: [unfilled] Patient seen and staffed with Dr. Jina Evans MD  Dermatology Resident PGY-2  I, Veronica Muro MD, saw this patient with the resident and agree with the resident s findings and plan of care as documented in the resident s note.   ____________________________________________    CC: Derm Problem (Psoriasis- humira, no changes, no irritation, no flare up)    HPI:  Mr. Minh Pereira is a(n) 39 year old male  who presents today as a return patient for psoriasis follow up.  Patient was last seen in June 2023 by Dr. Mcnamara at which time he was continued on Humira for his psoriasis and tretinoin for his pitted keratolysis.    Patient reports that his psoriasis is clear. He continues on the Humira without any difficulties receiving or injecting the medications.  He denies any flares since his last appointment and denies needing any topicals. He does report rare joint stiffness to his toes and heels.  Denies any worsening of joint symptoms.    Patient also reports some corns to his heels and between his right fourth and fifth toes which have been present for many years.  He reports putting some medications disks on it, files them down, and soaks the areas, with out significant improvement.    Patient is otherwise feeling well, without additional skin concerns.    Labs Reviewed:   Latest Reference Range & Units 06/29/23 08:30   Quantiferon-TB Gold Plus Result Negative  Negative   TB1 Ag minus Nil Value IU/mL -0.04   TB2 Ag minus Nil Value IU/mL -0.03   Mitogen minus Nil Result IU/mL 9.87   Nil Result IU/mL 0.13   QUANTIFERON-TB GOLD PLUS  Rpt   HCV RNA Quant IU/ml Not Detected IU/mL Not Detected   Rpt: View report in Results Review for more information    Physical Exam:  Vitals: There were no vitals taken for this visit.  SKIN: Focused examination of arms, legs, and palms/soles was performed.  - On the palms along the DIP and PIP joints there are pits surrounded by hyperkeratotic scale  - Pitting and hyperkeratotic plaques to the heels  - No plaques to the elbows, knees, or forearms   - No other lesions of concern on areas examined.     Medications:  Current Outpatient Medications   Medication Sig Dispense Refill    Adalimumab (HUMIRA, 2 PEN,) 40 MG/0.4ML pen kit Inject 0.4 mLs (40 mg) subcutaneously every 14 days. 0.8 mL 0    diclofenac (VOLTAREN) 1 % topical gel Apply 4 g topically 4 times daily as needed for moderate  pain (Patient not taking: Reported on 2/27/2025) 50 g 3    tretinoin (RETIN-A) 0.1 % external cream Apply topically At Bedtime To the hands (Patient not taking: Reported on 2/27/2025) 45 g 11    vitamin D2 (ERGOCALCIFEROL) 91838 units (1250 mcg) capsule Take 1 capsule (50,000 Units) by mouth once a week (Patient not taking: Reported on 2/27/2025) 8 capsule 0    vitamin D3 (CHOLECALCIFEROL) 50 mcg (2000 units) tablet Take 1 tablet (50 mcg) by mouth daily Start after completion of high dose weekly vitamin D use. (Patient not taking: Reported on 2/27/2025) 90 tablet 3     No current facility-administered medications for this visit.      Past Medical History:   Patient Active Problem List   Diagnosis    Psoriatic arthropathy (H)    Clavus    Backache    Hyperkeratosis of palms and soles    Psoriasis    Encounter for long-term (current) use of high-risk medication    History of hepatitis C     Past Medical History:   Diagnosis Date    History of hepatitis C     SVR 12/16/16    History of vitamin D deficiency     Psoriasis with arthropathy (H)        CC No referring provider defined for this encounter. on close of this encounter.

## 2025-02-27 ENCOUNTER — OFFICE VISIT (OUTPATIENT)
Dept: DERMATOLOGY | Facility: CLINIC | Age: 40
End: 2025-02-27

## 2025-02-27 ENCOUNTER — LAB (OUTPATIENT)
Dept: LAB | Facility: CLINIC | Age: 40
End: 2025-02-27

## 2025-02-27 DIAGNOSIS — L40.9 PSORIASIS: ICD-10-CM

## 2025-02-27 DIAGNOSIS — L08.89 PITTED KERATOLYSIS: ICD-10-CM

## 2025-02-27 DIAGNOSIS — L40.9 PSORIASIS: Primary | ICD-10-CM

## 2025-02-27 PROCEDURE — 99000 SPECIMEN HANDLING OFFICE-LAB: CPT | Performed by: PATHOLOGY

## 2025-02-27 PROCEDURE — 36415 COLL VENOUS BLD VENIPUNCTURE: CPT | Performed by: PATHOLOGY

## 2025-02-27 PROCEDURE — 86481 TB AG RESPONSE T-CELL SUSP: CPT | Performed by: DERMATOLOGY

## 2025-02-27 PROCEDURE — 87522 HEPATITIS C REVRS TRNSCRPJ: CPT | Performed by: DERMATOLOGY

## 2025-02-27 ASSESSMENT — PAIN SCALES - GENERAL: PAINLEVEL_OUTOF10: NO PAIN (0)

## 2025-02-27 NOTE — NURSING NOTE
Dermatology Rooming Note    Minh Pereira's goals for this visit include:   Chief Complaint   Patient presents with    Derm Problem     Psoriasis- humira, no changes, no irritation, no flare up     Nubia Fay CA

## 2025-02-27 NOTE — LETTER
2/27/2025       RE: Minh Pereira  [address]  [facility] [place] [address]     Dear Colleague,    Thank you for referring your patient, Minh Pereira, to the [facility]ERMATOLOG[facility] [place] at [facility]. Please see a copy of my visit note below.    [facility] Dermatology Note  Encounter Date: Feb 27, 2025  Office Visit     Dermatology Problem List:  # Plaque psoriasis and psoriatic arthritis: Many years of disease.   - Has done well on Humira, initially started ~2016, resumed 9/2020  - s/p methotrexate, failed Enbrel/flared on enbrel  - Pertinent PMH of hep C s/p treatment; cleared HCV RNA in 2016  - s/p triamcinolone ointment, desonide ointment  # Pitted palmar keratolysis  - Urea 20% cream  # Onychomycosis. culture 08/11/2016, s/p terbinafine 250 mg daily 2017  ____________________________________________    Assessment & Plan:     # Plaque psoriasis with psoriatic arthritis. Chronic, at goal of clear on Humira since 2020. Last quant gold and Hep C RNA negative in June 2023. Will plan to complete these lab today and continue with Humira. Patient is not requiring topicals and declines refills at this time.    - Labs today: quant gold and Hep C RNA PCR  - Continue Humira pending above labs  - Continue topicals with triamcinolone to body BID PRN (declined refills today)      # Pitted palmar keratolysis, palms and soles   Reviewed diagnosis and treatment options. Patient reports trialing urea cream on palms infrequently which does help. We discussed optimization of topicals and application to the soles as well.   - Increase use of  urea 20% cream to daily to palms and soles     Procedures Performed:   None    Follow-up: 1 year(s) in-person, or earlier for new or changing lesions    Staff and Resident: [unfilled] Patient seen and staffed with Dr. Jina Evans MD  Dermatology Resident PGY-2  I,  Veronica Muro MD, saw this patient with the resident and agree with the resident s findings and plan of care as documented in the resident s note.   ____________________________________________    CC: Derm Problem (Psoriasis- humira, no changes, no irritation, no flare up)    HPI:  Mr. Minh Pereira is a(n) 39 year old male who presents today as a return patient for psoriasis follow up.  Patient was last seen in June 2023 by Dr. Mcnamara at which time he was continued on Humira for his psoriasis and tretinoin for his pitted keratolysis.    Patient reports that his psoriasis is clear. He continues on the Humira without any difficulties receiving or injecting the medications.  He denies any flares since his last appointment and denies needing any topicals. He does report rare joint stiffness to his toes and heels.  Denies any worsening of joint symptoms.    Patient also reports some corns to his heels and between his right fourth and fifth toes which have been present for many years.  He reports putting some medications disks on it, files them down, and soaks the areas, with out significant improvement.    Patient is otherwise feeling well, without additional skin concerns.    Labs Reviewed:   Latest Reference Range & Units 06/29/23 08:30   Quantiferon-TB Gold Plus Result Negative  Negative   TB1 Ag minus Nil Value IU/mL -0.04   TB2 Ag minus Nil Value IU/mL -0.03   Mitogen minus Nil Result IU/mL 9.87   Nil Result IU/mL 0.13   QUANTIFERON-TB GOLD PLUS  Rpt   HCV RNA Quant IU/ml Not Detected IU/mL Not Detected   Rpt: View report in Results Review for more information    Physical Exam:  Vitals: There were no vitals taken for this visit.  SKIN: Focused examination of arms, legs, and palms/soles was performed.  - On the palms along the DIP and PIP joints there are pits surrounded by hyperkeratotic scale  - Pitting and hyperkeratotic plaques to the heels  - No plaques to the elbows, knees, or forearms   - No  other lesions of concern on areas examined.     Medications:  Current Outpatient Medications   Medication Sig Dispense Refill     Adalimumab (HUMIRA, 2 PEN,) 40 MG/0.4ML pen kit Inject 0.4 mLs (40 mg) subcutaneously every 14 days. 0.8 mL 0     diclofenac (VOLTAREN) 1 % topical gel Apply 4 g topically 4 times daily as needed for moderate pain (Patient not taking: Reported on 2/27/2025) 50 g 3     tretinoin (RETIN-A) 0.1 % external cream Apply topically At Bedtime To the hands (Patient not taking: Reported on 2/27/2025) 45 g 11     vitamin D2 (ERGOCALCIFEROL) 97406 units (1250 mcg) capsule Take 1 capsule (50,000 Units) by mouth once a week (Patient not taking: Reported on 2/27/2025) 8 capsule 0     vitamin D3 (CHOLECALCIFEROL) 50 mcg (2000 units) tablet Take 1 tablet (50 mcg) by mouth daily Start after completion of high dose weekly vitamin D use. (Patient not taking: Reported on 2/27/2025) 90 tablet 3     No current facility-administered medications for this visit.      Past Medical History:   Patient Active Problem List   Diagnosis     Psoriatic arthropathy (H)     Clavus     Backache     Hyperkeratosis of palms and soles     Psoriasis     Encounter for long-term (current) use of high-risk medication     History of hepatitis C     Past Medical History:   Diagnosis Date     History of hepatitis C     SVR 12/16/16     History of vitamin D deficiency      Psoriasis with arthropathy (H)        CC No referring provider defined for this encounter. on close of this encounter.      Again, thank you for allowing me to participate in the care of your patient.      Sincerely,    Anaya Evans MD

## 2025-02-28 LAB
HCV RNA SERPL NAA+PROBE-ACNC: NOT DETECTED IU/ML
QUANTIFERON MITOGEN: 10 IU/ML
QUANTIFERON NIL TUBE: 0.04 IU/ML
QUANTIFERON TB1 TUBE: 0.06 IU/ML
QUANTIFERON TB2 TUBE: 0.05

## 2025-03-01 LAB
GAMMA INTERFERON BACKGROUND BLD IA-ACNC: 0.04 IU/ML
M TB IFN-G BLD-IMP: NEGATIVE
M TB IFN-G CD4+ BCKGRND COR BLD-ACNC: 9.96 IU/ML
MITOGEN IGNF BCKGRD COR BLD-ACNC: 0.01 IU/ML
MITOGEN IGNF BCKGRD COR BLD-ACNC: 0.02 IU/ML

## 2025-03-04 NOTE — PROGRESS NOTES
Medication Therapy Management (MTM) Encounter    ASSESSMENT:                            Plaque psoriasis and psoriatic arthritis: Symptoms stable at this time, no recent flares and is not needing topicals at this time.  Unfortunately, he did not feel the Humira when we had approval in 2024, and he has 1 last injection to give today therefore I will reach out to [employer] about reapplying him for [employer] PAP.  I told him to anticipate her call that way we can reapply him ASAP.  Prebiologic labs reviewed  Regarding vaccines, he has declined influenza and COVID vaccines in the past.  Could consider Prevnar 20 and Shingrix in the future once he has insurance again.    Vitamin D Deficiency: Consider rechecking vitamin D with future labs.    PLAN:                            MTM to reach out to pharmacy liaison's about reapplying patient for [employer] PAP in order to continue Humira 40 mg every 2 weeks  Future vaccines to consider when patient has insurance: Prevnar 20 and Shingrix  Future lab: Vitamin D    Follow-up: Pending Humira coverage via phone     SUBJECTIVE/OBJECTIVE:                          Minh Pereira is a 39 year old male called for a initial visit (last seen over a year ago on 9/20/2023). He was referred to me from Dr. Muro.      Reason for visit: Humira continuation  Medication Adherence/Access: Reports that he had insurance last year, but this year he did not sign up for insurance through his employer and he will not be able to until up enrollment again.  He works overnights.    Plaque psoriasis and psoriatic arthritis:   - Humira 40 mg every 2 weeks --1 more pen at home that he plans on getting today (was due 3/2/25)  Last follow-up with dermatology on 2/27/2025  He has been on Humira since about 2016  Of note, patient was previously approved for [employer] patient assistance program in 2024.  Rare joint stiffness  Not using topicals  Past medications used: methotrexate, Enbrel (flared on enbrel),  triamcinolone ointment, desonide ointment     Pre-Biologic Screening:  -- Hep B Surface Antibody reactive on 9/29/20  -- Hep B Surface Antigen non-reactive on 9/29/20  -- Hep B Core Antibody  non-reactive on 9/29/20  -- Hep C RNA not detected on 2/27/2025. PMH of hep C s/p treatment; cleared HCV RNA in 2016   -- QuantiFERON-Tb testing: negative on 3/27/2025    Vaccinations:  All patients on biologics should avoid live vaccines (varicella/VZV, intranasal influenza, MMR, or yellow fever vaccine (if traveling))    -- Influenza (every year): n/a  -- TdaP (every 10 years): last 5/16/2024  -- Pneumococcal Pneumonia n/a   Prevnar-13:    Pneumovax-23:    Prevnar-20:   -- COVID-19: Last 10/20/2021  -- Varicella/Zoster    Varicella n/a   Zoster n/a    Vitamin D Deficiency:  Started on weekly vitamin D on 5/16/2024, however PA was denied and he was recommended to purchase OTC  Lab Results   Component Value Date    VITDT 16 (L) 05/16/2024       Today's Vitals: There were no vitals taken for this visit.    Allergies/ADRs: Reviewed in chart  Past Medical History: Reviewed in chart  Tobacco: He reports that he has been smoking cigarettes. He has a 4 pack-year smoking history. He has never used smokeless tobacco.Nicotine/Tobacco Cessation Plan:   Information offered: Patient not interested at this time  Alcohol: reviewed in chart    ----------------      I spent 4 minutes with this patient today. All changes were made via collaborative practice agreement with Veronica Muro. A copy of the visit note was provided to the patient's provider(s).    A summary of these recommendations was declined by the patient.    Pinky German, Pharm.D., BCACP   Medication Therapy Management Pharmacist   [facility] Dermatology    Telemedicine Visit Details  The patient's medications can be safely assessed via a telemedicine encounter.  Type of service:  Telephone visit  Originating Location (pt. Location): Home    Distant Location  (provider location):  Off-site  Start Time:  9:35 AM  End Time:  9:39 AM     Medication Therapy Recommendations  No medication therapy recommendations to display

## 2025-03-05 ENCOUNTER — VIRTUAL VISIT (OUTPATIENT)
Dept: PHARMACY | Facility: CLINIC | Age: 40
End: 2025-03-05
Attending: DERMATOLOGY

## 2025-03-05 DIAGNOSIS — L40.50 PSORIATIC ARTHROPATHY (H): Primary | ICD-10-CM

## 2025-03-05 DIAGNOSIS — Z71.89 ENCOUNTER FOR HERB AND VITAMIN SUPPLEMENT MANAGEMENT: ICD-10-CM

## 2025-03-05 NOTE — Clinical Note
Patient does not have insurance again in 2025 -- last dose of Humira today. Can you set him up with PAP again? He would prefer a phone call, thanks!

## 2025-03-07 ENCOUNTER — TELEPHONE (OUTPATIENT)
Dept: DERMATOLOGY | Facility: CLINIC | Age: 40
End: 2025-03-07

## 2025-03-07 NOTE — TELEPHONE ENCOUNTER
Patient doesn't have insurance at this time. Emailed free drug program application to patient and provider portion over to MTM to have provider sign

## 2025-03-11 NOTE — TELEPHONE ENCOUNTER
FREE DRUG APPLICATION INITIATED    Medication: HUMIRA (2 PEN) 40 MG/0.4ML SC AJKT  Free Drug Program Name:  [facility]  Date Submitted: 3/7/2025  8:35 AM  Phone #: [phone]  Fax #: [phone]  Additional Information: faxed patient portion

## 2025-03-12 NOTE — TELEPHONE ENCOUNTER
Scanned signed provider form to pharmacy liaison    Tiara Wu, AnkitD, MPH  Medication Therapy Management Pharmacist  [facility]ermat[facility]

## 2025-03-17 NOTE — TELEPHONE ENCOUNTER
FREE DRUG APPLICATION APPROVED    Medication: HUMIRA (2 PEN) 40 MG/0.4ML SC AJKT  Program Name:  [employer]  Effective Date: 3/13/2025  Expiration Date: 3/13/2026  Pharmacy Filling the Rx: [facility] - [address]

## 2025-06-14 ENCOUNTER — HEALTH MAINTENANCE LETTER (OUTPATIENT)
Age: 40
End: 2025-06-14

## 2025-06-30 ENCOUNTER — TELEPHONE (OUTPATIENT)
Dept: DERMATOLOGY | Facility: CLINIC | Age: 40
End: 2025-06-30

## 2025-07-01 NOTE — TELEPHONE ENCOUNTER
6/30     Cristina pt order for Feb 2026. Nothing open, okay for SHAGGY?  
7/1 Patient confirmed scheduled appointment:  Date: 3/5/2026  Time: 9:20 AM  Visit type: Return Dermatology  Provider: Cristina  Location: [facility]  Testing/imaging: na  Additional notes: Okay to use SHAGGY per [place]   
- - -